# Patient Record
Sex: MALE | Race: OTHER | HISPANIC OR LATINO | Employment: OTHER | URBAN - METROPOLITAN AREA
[De-identification: names, ages, dates, MRNs, and addresses within clinical notes are randomized per-mention and may not be internally consistent; named-entity substitution may affect disease eponyms.]

---

## 2019-09-09 PROBLEM — C67.9 MALIGNANT NEOPLASM OF URINARY BLADDER: Status: ACTIVE | Noted: 2019-09-09

## 2019-09-16 ENCOUNTER — LAB VISIT (OUTPATIENT)
Dept: LAB | Facility: HOSPITAL | Age: 59
End: 2019-09-16
Attending: INTERNAL MEDICINE
Payer: COMMERCIAL

## 2019-09-16 DIAGNOSIS — C67.9 MALIGNANT NEOPLASM OF URINARY BLADDER, UNSPECIFIED SITE: ICD-10-CM

## 2019-09-16 LAB
ALBUMIN SERPL BCP-MCNC: 4.4 G/DL (ref 3.5–5.2)
ALP SERPL-CCNC: 89 U/L (ref 38–145)
ALT SERPL W/O P-5'-P-CCNC: 29 U/L (ref 10–44)
ANION GAP SERPL CALC-SCNC: 9 MMOL/L (ref 8–16)
AST SERPL-CCNC: 19 U/L (ref 17–59)
BASOPHILS # BLD AUTO: 0.07 K/UL (ref 0–0.2)
BASOPHILS NFR BLD: 0.5 % (ref 0–1.9)
BILIRUB SERPL-MCNC: 0.6 MG/DL (ref 0.2–1.3)
BUN SERPL-MCNC: 21 MG/DL (ref 9–21)
CALCIUM SERPL-MCNC: 9.1 MG/DL (ref 8.4–10.2)
CHLORIDE SERPL-SCNC: 105 MMOL/L (ref 95–110)
CO2 SERPL-SCNC: 25 MMOL/L (ref 22–31)
CREAT SERPL-MCNC: 1.45 MG/DL (ref 0.5–1.4)
DIFFERENTIAL METHOD: ABNORMAL
EOSINOPHIL # BLD AUTO: 0.3 K/UL (ref 0–0.5)
EOSINOPHIL NFR BLD: 1.8 % (ref 0–8)
ERYTHROCYTE [DISTWIDTH] IN BLOOD BY AUTOMATED COUNT: 14 % (ref 11.5–14.5)
EST. GFR  (AFRICAN AMERICAN): >60 ML/MIN/1.73 M^2
EST. GFR  (NON AFRICAN AMERICAN): 52 ML/MIN/1.73 M^2
GLUCOSE SERPL-MCNC: 110 MG/DL (ref 70–110)
HCT VFR BLD AUTO: 43.4 % (ref 40–54)
HGB BLD-MCNC: 14.3 G/DL (ref 14–18)
IMM GRANULOCYTES # BLD AUTO: 0.07 K/UL (ref 0–0.04)
IMM GRANULOCYTES NFR BLD AUTO: 0.5 % (ref 0–0.5)
LYMPHOCYTES # BLD AUTO: 1.6 K/UL (ref 1–4.8)
LYMPHOCYTES NFR BLD: 11.4 % (ref 18–48)
MAGNESIUM SERPL-MCNC: 2.4 MG/DL (ref 1.6–2.6)
MCH RBC QN AUTO: 27.5 PG (ref 27–31)
MCHC RBC AUTO-ENTMCNC: 32.9 G/DL (ref 32–36)
MCV RBC AUTO: 84 FL (ref 82–98)
MONOCYTES # BLD AUTO: 0.9 K/UL (ref 0.3–1)
MONOCYTES NFR BLD: 6.9 % (ref 4–15)
NEUTROPHILS # BLD AUTO: 10.7 K/UL (ref 1.8–7.7)
NEUTROPHILS NFR BLD: 78.9 % (ref 38–73)
NRBC BLD-RTO: 0 /100 WBC
PLATELET # BLD AUTO: 350 K/UL (ref 150–350)
PMV BLD AUTO: 9.3 FL (ref 9.2–12.9)
POTASSIUM SERPL-SCNC: 4.5 MMOL/L (ref 3.5–5.1)
PROT SERPL-MCNC: 7.6 G/DL (ref 6–8.4)
RBC # BLD AUTO: 5.2 M/UL (ref 4.6–6.2)
SODIUM SERPL-SCNC: 139 MMOL/L (ref 136–145)
WBC # BLD AUTO: 13.6 K/UL (ref 3.9–12.7)

## 2019-09-16 PROCEDURE — 85025 COMPLETE CBC W/AUTO DIFF WBC: CPT

## 2019-09-16 PROCEDURE — 83735 ASSAY OF MAGNESIUM: CPT | Mod: PN

## 2019-09-16 PROCEDURE — 80053 COMPREHEN METABOLIC PANEL: CPT | Mod: PN

## 2019-09-16 PROCEDURE — 36415 COLL VENOUS BLD VENIPUNCTURE: CPT | Mod: PN

## 2019-09-16 PROCEDURE — 85025 COMPLETE CBC W/AUTO DIFF WBC: CPT | Mod: PN

## 2019-09-16 PROCEDURE — 80053 COMPREHEN METABOLIC PANEL: CPT

## 2019-09-16 PROCEDURE — 83735 ASSAY OF MAGNESIUM: CPT

## 2019-09-23 ENCOUNTER — LAB VISIT (OUTPATIENT)
Dept: LAB | Facility: HOSPITAL | Age: 59
End: 2019-09-23
Attending: INTERNAL MEDICINE
Payer: COMMERCIAL

## 2019-09-23 DIAGNOSIS — C67.9 MALIGNANT NEOPLASM OF URINARY BLADDER, UNSPECIFIED SITE: ICD-10-CM

## 2019-09-23 LAB
BASOPHILS # BLD AUTO: 0.05 K/UL (ref 0–0.2)
BASOPHILS NFR BLD: 0.7 % (ref 0–1.9)
DIFFERENTIAL METHOD: ABNORMAL
EOSINOPHIL # BLD AUTO: 0.3 K/UL (ref 0–0.5)
EOSINOPHIL NFR BLD: 4 % (ref 0–8)
ERYTHROCYTE [DISTWIDTH] IN BLOOD BY AUTOMATED COUNT: 14.2 % (ref 11.5–14.5)
HCT VFR BLD AUTO: 42.8 % (ref 40–54)
HGB BLD-MCNC: 13.8 G/DL (ref 14–18)
IMM GRANULOCYTES # BLD AUTO: 0.04 K/UL (ref 0–0.04)
IMM GRANULOCYTES NFR BLD AUTO: 0.6 % (ref 0–0.5)
LYMPHOCYTES # BLD AUTO: 1.4 K/UL (ref 1–4.8)
LYMPHOCYTES NFR BLD: 20.1 % (ref 18–48)
MCH RBC QN AUTO: 26.7 PG (ref 27–31)
MCHC RBC AUTO-ENTMCNC: 32.2 G/DL (ref 32–36)
MCV RBC AUTO: 83 FL (ref 82–98)
MONOCYTES # BLD AUTO: 0.7 K/UL (ref 0.3–1)
MONOCYTES NFR BLD: 10.1 % (ref 4–15)
NEUTROPHILS # BLD AUTO: 4.6 K/UL (ref 1.8–7.7)
NEUTROPHILS NFR BLD: 64.5 % (ref 38–73)
NRBC BLD-RTO: 0 /100 WBC
PLATELET # BLD AUTO: 325 K/UL (ref 150–350)
PMV BLD AUTO: 9.3 FL (ref 9.2–12.9)
RBC # BLD AUTO: 5.17 M/UL (ref 4.6–6.2)
WBC # BLD AUTO: 7.06 K/UL (ref 3.9–12.7)

## 2019-09-23 PROCEDURE — 36415 COLL VENOUS BLD VENIPUNCTURE: CPT | Mod: PN

## 2019-09-23 PROCEDURE — 85025 COMPLETE CBC W/AUTO DIFF WBC: CPT | Mod: PN

## 2019-09-23 PROCEDURE — 85025 COMPLETE CBC W/AUTO DIFF WBC: CPT

## 2019-09-24 ENCOUNTER — INFUSION (OUTPATIENT)
Dept: INFUSION THERAPY | Facility: HOSPITAL | Age: 59
End: 2019-09-24
Attending: INTERNAL MEDICINE
Payer: COMMERCIAL

## 2019-09-24 VITALS
HEART RATE: 75 BPM | DIASTOLIC BLOOD PRESSURE: 87 MMHG | RESPIRATION RATE: 16 BRPM | SYSTOLIC BLOOD PRESSURE: 140 MMHG | TEMPERATURE: 98 F

## 2019-09-24 DIAGNOSIS — C67.9 MALIGNANT NEOPLASM OF URINARY BLADDER, UNSPECIFIED SITE: Primary | ICD-10-CM

## 2019-09-24 PROCEDURE — 96366 THER/PROPH/DIAG IV INF ADDON: CPT | Mod: PN

## 2019-09-24 PROCEDURE — 96413 CHEMO IV INFUSION 1 HR: CPT | Mod: PN

## 2019-09-24 PROCEDURE — 96417 CHEMO IV INFUS EACH ADDL SEQ: CPT | Mod: PN

## 2019-09-24 PROCEDURE — A4216 STERILE WATER/SALINE, 10 ML: HCPCS | Mod: PN | Performed by: INTERNAL MEDICINE

## 2019-09-24 PROCEDURE — 96367 TX/PROPH/DG ADDL SEQ IV INF: CPT | Mod: PN

## 2019-09-24 PROCEDURE — 63600175 PHARM REV CODE 636 W HCPCS: Mod: PN | Performed by: INTERNAL MEDICINE

## 2019-09-24 PROCEDURE — 25000003 PHARM REV CODE 250: Mod: PN | Performed by: INTERNAL MEDICINE

## 2019-09-24 RX ORDER — SODIUM CHLORIDE 0.9 % (FLUSH) 0.9 %
10 SYRINGE (ML) INJECTION
Status: DISCONTINUED | OUTPATIENT
Start: 2019-09-24 | End: 2019-09-24 | Stop reason: HOSPADM

## 2019-09-24 RX ADMIN — CISPLATIN 75 MG: 100 INJECTION, SOLUTION INTRAVENOUS at 12:09

## 2019-09-24 RX ADMIN — GEMCITABINE 2150 MG: 38 INJECTION, SOLUTION INTRAVENOUS at 02:09

## 2019-09-24 RX ADMIN — POTASSIUM CHLORIDE: 2 INJECTION, SOLUTION, CONCENTRATE INTRAVENOUS at 09:09

## 2019-09-24 RX ADMIN — POTASSIUM CHLORIDE: 2 INJECTION, SOLUTION, CONCENTRATE INTRAVENOUS at 02:09

## 2019-09-24 RX ADMIN — APREPITANT 130 MG: 130 INJECTION, EMULSION INTRAVENOUS at 11:09

## 2019-09-24 RX ADMIN — DEXAMETHASONE SODIUM PHOSPHATE: 4 INJECTION, SOLUTION INTRA-ARTICULAR; INTRALESIONAL; INTRAMUSCULAR; INTRAVENOUS; SOFT TISSUE at 12:09

## 2019-09-24 RX ADMIN — SODIUM CHLORIDE, PRESERVATIVE FREE 10 ML: 5 INJECTION INTRAVENOUS at 09:09

## 2019-09-24 NOTE — PLAN OF CARE
Pt. Completed treatment, tolerated without noted distress.Vtial signs stable. Patient discharged from infusion center ambulatory with wife. Reviewed with patient and wife the use of ordered antiemetics and when to phone MD office and infusion center. Infection prevention and adequate hydration  reviewed with patient and wife as well. Patient and wife had all present questions answered.

## 2019-09-25 ENCOUNTER — DOCUMENTATION ONLY (OUTPATIENT)
Dept: INFUSION THERAPY | Facility: HOSPITAL | Age: 59
End: 2019-09-25

## 2019-09-25 NOTE — PROGRESS NOTES
Nutrition: Met with patient on 9/24/209 for chemo new patient educaiton at the chair side. Will followup with patient at cycle 2. Marium Quinn,MS, RD, LDN

## 2019-09-30 ENCOUNTER — LAB VISIT (OUTPATIENT)
Dept: LAB | Facility: HOSPITAL | Age: 59
End: 2019-09-30
Attending: INTERNAL MEDICINE
Payer: COMMERCIAL

## 2019-09-30 DIAGNOSIS — C67.9 MALIGNANT NEOPLASM OF URINARY BLADDER, UNSPECIFIED SITE: ICD-10-CM

## 2019-09-30 LAB
ALBUMIN SERPL BCP-MCNC: 3.9 G/DL (ref 3.5–5.2)
ALP SERPL-CCNC: 81 U/L (ref 38–145)
ALT SERPL W/O P-5'-P-CCNC: 29 U/L (ref 10–44)
ANION GAP SERPL CALC-SCNC: 7 MMOL/L (ref 8–16)
AST SERPL-CCNC: 19 U/L (ref 17–59)
BASOPHILS # BLD AUTO: 0.03 K/UL (ref 0–0.2)
BASOPHILS NFR BLD: 0.5 % (ref 0–1.9)
BILIRUB SERPL-MCNC: 0.5 MG/DL (ref 0.2–1.3)
BUN SERPL-MCNC: 15 MG/DL (ref 9–21)
CALCIUM SERPL-MCNC: 8.6 MG/DL (ref 8.4–10.2)
CHLORIDE SERPL-SCNC: 103 MMOL/L (ref 95–110)
CO2 SERPL-SCNC: 26 MMOL/L (ref 22–31)
CREAT SERPL-MCNC: 1.43 MG/DL (ref 0.5–1.4)
DIFFERENTIAL METHOD: ABNORMAL
EOSINOPHIL # BLD AUTO: 0.1 K/UL (ref 0–0.5)
EOSINOPHIL NFR BLD: 1.9 % (ref 0–8)
ERYTHROCYTE [DISTWIDTH] IN BLOOD BY AUTOMATED COUNT: 14.1 % (ref 11.5–14.5)
EST. GFR  (AFRICAN AMERICAN): >60 ML/MIN/1.73 M^2
EST. GFR  (NON AFRICAN AMERICAN): 53 ML/MIN/1.73 M^2
GLUCOSE SERPL-MCNC: 128 MG/DL (ref 70–110)
HCT VFR BLD AUTO: 40.2 % (ref 40–54)
HGB BLD-MCNC: 12.7 G/DL (ref 14–18)
IMM GRANULOCYTES # BLD AUTO: 0.03 K/UL (ref 0–0.04)
IMM GRANULOCYTES NFR BLD AUTO: 0.5 % (ref 0–0.5)
LYMPHOCYTES # BLD AUTO: 1 K/UL (ref 1–4.8)
LYMPHOCYTES NFR BLD: 17.3 % (ref 18–48)
MAGNESIUM SERPL-MCNC: 2.3 MG/DL (ref 1.6–2.6)
MCH RBC QN AUTO: 26.2 PG (ref 27–31)
MCHC RBC AUTO-ENTMCNC: 31.6 G/DL (ref 32–36)
MCV RBC AUTO: 83 FL (ref 82–98)
MONOCYTES # BLD AUTO: 0.5 K/UL (ref 0.3–1)
MONOCYTES NFR BLD: 7.8 % (ref 4–15)
NEUTROPHILS # BLD AUTO: 4.2 K/UL (ref 1.8–7.7)
NEUTROPHILS NFR BLD: 72 % (ref 38–73)
NRBC BLD-RTO: 0 /100 WBC
PLATELET # BLD AUTO: 213 K/UL (ref 150–350)
PMV BLD AUTO: 9.5 FL (ref 9.2–12.9)
POTASSIUM SERPL-SCNC: 4.4 MMOL/L (ref 3.5–5.1)
PROT SERPL-MCNC: 6.8 G/DL (ref 6–8.4)
RBC # BLD AUTO: 4.85 M/UL (ref 4.6–6.2)
SODIUM SERPL-SCNC: 136 MMOL/L (ref 136–145)
WBC # BLD AUTO: 5.89 K/UL (ref 3.9–12.7)

## 2019-09-30 PROCEDURE — 80053 COMPREHEN METABOLIC PANEL: CPT

## 2019-09-30 PROCEDURE — 83735 ASSAY OF MAGNESIUM: CPT | Mod: PN

## 2019-09-30 PROCEDURE — 85025 COMPLETE CBC W/AUTO DIFF WBC: CPT

## 2019-09-30 PROCEDURE — 85025 COMPLETE CBC W/AUTO DIFF WBC: CPT | Mod: PN

## 2019-09-30 PROCEDURE — 80053 COMPREHEN METABOLIC PANEL: CPT | Mod: PN

## 2019-09-30 PROCEDURE — 36415 COLL VENOUS BLD VENIPUNCTURE: CPT | Mod: PN

## 2019-09-30 PROCEDURE — 83735 ASSAY OF MAGNESIUM: CPT

## 2019-10-02 ENCOUNTER — INFUSION (OUTPATIENT)
Dept: INFUSION THERAPY | Facility: HOSPITAL | Age: 59
End: 2019-10-02
Attending: INTERNAL MEDICINE
Payer: COMMERCIAL

## 2019-10-02 VITALS
HEART RATE: 80 BPM | WEIGHT: 217.63 LBS | DIASTOLIC BLOOD PRESSURE: 74 MMHG | HEIGHT: 67 IN | BODY MASS INDEX: 34.16 KG/M2 | RESPIRATION RATE: 18 BRPM | SYSTOLIC BLOOD PRESSURE: 118 MMHG | TEMPERATURE: 98 F

## 2019-10-02 DIAGNOSIS — C67.9 MALIGNANT NEOPLASM OF URINARY BLADDER, UNSPECIFIED SITE: Primary | ICD-10-CM

## 2019-10-02 PROCEDURE — 96366 THER/PROPH/DIAG IV INF ADDON: CPT | Mod: PN

## 2019-10-02 PROCEDURE — 96375 TX/PRO/DX INJ NEW DRUG ADDON: CPT | Mod: PN

## 2019-10-02 PROCEDURE — 96367 TX/PROPH/DG ADDL SEQ IV INF: CPT | Mod: PN

## 2019-10-02 PROCEDURE — 63600175 PHARM REV CODE 636 W HCPCS: Mod: PN | Performed by: INTERNAL MEDICINE

## 2019-10-02 PROCEDURE — 96417 CHEMO IV INFUS EACH ADDL SEQ: CPT | Mod: PN

## 2019-10-02 PROCEDURE — 96413 CHEMO IV INFUSION 1 HR: CPT | Mod: PN

## 2019-10-02 RX ORDER — SODIUM CHLORIDE 0.9 % (FLUSH) 0.9 %
10 SYRINGE (ML) INJECTION
Status: DISCONTINUED | OUTPATIENT
Start: 2019-10-02 | End: 2019-10-02 | Stop reason: HOSPADM

## 2019-10-02 RX ADMIN — POTASSIUM CHLORIDE: 2 INJECTION, SOLUTION, CONCENTRATE INTRAVENOUS at 01:10

## 2019-10-02 RX ADMIN — APREPITANT 130 MG: 130 INJECTION, EMULSION INTRAVENOUS at 09:10

## 2019-10-02 RX ADMIN — POTASSIUM CHLORIDE: 2 INJECTION, SOLUTION, CONCENTRATE INTRAVENOUS at 09:10

## 2019-10-02 RX ADMIN — GEMCITABINE 2150 MG: 38 INJECTION, SOLUTION INTRAVENOUS at 11:10

## 2019-10-02 RX ADMIN — CISPLATIN 75 MG: 100 INJECTION, SOLUTION INTRAVENOUS at 12:10

## 2019-10-02 RX ADMIN — DEXAMETHASONE SODIUM PHOSPHATE: 4 INJECTION, SOLUTION INTRA-ARTICULAR; INTRALESIONAL; INTRAMUSCULAR; INTRAVENOUS; SOFT TISSUE at 09:10

## 2019-10-02 NOTE — PLAN OF CARE
"Problem: Adult Inpatient Plan of Care  Goal: Plan of Care Review  Outcome: Ongoing, Progressing    /74   Pulse 80   Temp 97.9 °F (36.6 °C)   Resp 18   Ht 5' 7" (1.702 m)   Wt 98.7 kg (217 lb 9.5 oz)   BMI 34.08 kg/m²   Patient tolerated treatment well. VSS. PIV flused with NS and blood return noted. Pt refused OBI today but will come in tomorrow for neulasta sub-q injection.      "

## 2019-10-02 NOTE — PLAN OF CARE
"Problem: Nausea and Vomiting (Chemotherapy Effects)  Goal: Fluid and Electrolyte Balance  Outcome: Ongoing, Progressing    BP (!) 132/90   Pulse 77   Temp 97.9 °F (36.6 °C)   Resp 18   Ht 5' 7" (1.702 m)   Wt 98.7 kg (217 lb 9.5 oz)   BMI 34.08 kg/m²   Patient here today for cisplatin/gemzar treatment. VSS. PIV placed to LAC on 2nd attempt. Pt tolerated well. Premeds given. Awaiting pre-hydration and txt meds.   Pt reports recent fevers and constipation, resolved.   Questions and concerns addressed.      "

## 2019-10-03 ENCOUNTER — INFUSION (OUTPATIENT)
Dept: INFUSION THERAPY | Facility: HOSPITAL | Age: 59
End: 2019-10-03
Attending: INTERNAL MEDICINE
Payer: COMMERCIAL

## 2019-10-03 VITALS
SYSTOLIC BLOOD PRESSURE: 127 MMHG | DIASTOLIC BLOOD PRESSURE: 85 MMHG | TEMPERATURE: 98 F | RESPIRATION RATE: 16 BRPM | HEART RATE: 73 BPM

## 2019-10-03 DIAGNOSIS — C67.9 MALIGNANT NEOPLASM OF URINARY BLADDER, UNSPECIFIED SITE: Primary | ICD-10-CM

## 2019-10-03 PROCEDURE — 96372 THER/PROPH/DIAG INJ SC/IM: CPT | Mod: PN

## 2019-10-03 PROCEDURE — 63600175 PHARM REV CODE 636 W HCPCS: Mod: JG,PN | Performed by: INTERNAL MEDICINE

## 2019-10-03 RX ADMIN — PEGFILGRASTIM 6 MG: 6 INJECTION SUBCUTANEOUS at 11:10

## 2019-10-03 NOTE — PLAN OF CARE
Tolerated injection without any distress.  Reviewed upcoming appointments.  Amb off unit independently by self.

## 2019-10-07 ENCOUNTER — LAB VISIT (OUTPATIENT)
Dept: LAB | Facility: HOSPITAL | Age: 59
End: 2019-10-07
Attending: INTERNAL MEDICINE
Payer: COMMERCIAL

## 2019-10-07 DIAGNOSIS — C67.9 MALIGNANT NEOPLASM OF URINARY BLADDER, UNSPECIFIED SITE: ICD-10-CM

## 2019-10-07 LAB
ANISOCYTOSIS BLD QL SMEAR: SLIGHT
BASOPHILS NFR BLD: 0 % (ref 0–1.9)
DIFFERENTIAL METHOD: ABNORMAL
DOHLE BOD BLD QL SMEAR: PRESENT
EOSINOPHIL NFR BLD: 0 % (ref 0–8)
ERYTHROCYTE [DISTWIDTH] IN BLOOD BY AUTOMATED COUNT: 14.6 % (ref 11.5–14.5)
HCT VFR BLD AUTO: 40.3 % (ref 40–54)
HGB BLD-MCNC: 12.7 G/DL (ref 14–18)
IMM GRANULOCYTES # BLD AUTO: ABNORMAL K/UL (ref 0–0.04)
IMM GRANULOCYTES NFR BLD AUTO: ABNORMAL % (ref 0–0.5)
LYMPHOCYTES NFR BLD: 6 % (ref 18–48)
MCH RBC QN AUTO: 26.5 PG (ref 27–31)
MCHC RBC AUTO-ENTMCNC: 31.5 G/DL (ref 32–36)
MCV RBC AUTO: 84 FL (ref 82–98)
MONOCYTES NFR BLD: 5 % (ref 4–15)
NEUTROPHILS # BLD AUTO: 22.2 K/UL (ref 1.8–7.7)
NEUTROPHILS NFR BLD: 80 % (ref 38–73)
NEUTS BAND NFR BLD MANUAL: 9 %
NRBC BLD-RTO: 0 /100 WBC
PLATELET # BLD AUTO: 191 K/UL (ref 150–350)
PMV BLD AUTO: 9 FL (ref 9.2–12.9)
POLYCHROMASIA BLD QL SMEAR: ABNORMAL
RBC # BLD AUTO: 4.8 M/UL (ref 4.6–6.2)
WBC # BLD AUTO: 24.98 K/UL (ref 3.9–12.7)

## 2019-10-07 PROCEDURE — 85007 BL SMEAR W/DIFF WBC COUNT: CPT

## 2019-10-07 PROCEDURE — 85027 COMPLETE CBC AUTOMATED: CPT

## 2019-10-07 PROCEDURE — 85027 COMPLETE CBC AUTOMATED: CPT | Mod: PN

## 2019-10-07 PROCEDURE — 85007 BL SMEAR W/DIFF WBC COUNT: CPT | Mod: PN

## 2019-10-07 PROCEDURE — 36415 COLL VENOUS BLD VENIPUNCTURE: CPT | Mod: PN

## 2019-10-15 ENCOUNTER — LAB VISIT (OUTPATIENT)
Dept: LAB | Facility: HOSPITAL | Age: 59
End: 2019-10-15
Attending: INTERNAL MEDICINE
Payer: COMMERCIAL

## 2019-10-15 DIAGNOSIS — C67.9 MALIGNANT NEOPLASM OF URINARY BLADDER, UNSPECIFIED SITE: ICD-10-CM

## 2019-10-15 LAB
ALBUMIN SERPL BCP-MCNC: 4.1 G/DL (ref 3.5–5.2)
ALP SERPL-CCNC: 106 U/L (ref 38–145)
ALT SERPL W/O P-5'-P-CCNC: 23 U/L (ref 10–44)
ANION GAP SERPL CALC-SCNC: 9 MMOL/L (ref 8–16)
ANISOCYTOSIS BLD QL SMEAR: SLIGHT
AST SERPL-CCNC: 21 U/L (ref 17–59)
BASOPHILS NFR BLD: 0 % (ref 0–1.9)
BILIRUB SERPL-MCNC: 0.4 MG/DL (ref 0.2–1.3)
BUN SERPL-MCNC: 21 MG/DL (ref 9–21)
CALCIUM SERPL-MCNC: 8.8 MG/DL (ref 8.4–10.2)
CHLORIDE SERPL-SCNC: 102 MMOL/L (ref 95–110)
CO2 SERPL-SCNC: 25 MMOL/L (ref 22–31)
CREAT SERPL-MCNC: 1.51 MG/DL (ref 0.5–1.4)
DIFFERENTIAL METHOD: ABNORMAL
EOSINOPHIL NFR BLD: 1 % (ref 0–8)
ERYTHROCYTE [DISTWIDTH] IN BLOOD BY AUTOMATED COUNT: 15.6 % (ref 11.5–14.5)
EST. GFR  (AFRICAN AMERICAN): 57 ML/MIN/1.73 M^2
EST. GFR  (NON AFRICAN AMERICAN): 49 ML/MIN/1.73 M^2
GLUCOSE SERPL-MCNC: 104 MG/DL (ref 70–110)
HCT VFR BLD AUTO: 39.1 % (ref 40–54)
HGB BLD-MCNC: 12.7 G/DL (ref 14–18)
IMM GRANULOCYTES # BLD AUTO: ABNORMAL K/UL (ref 0–0.04)
IMM GRANULOCYTES NFR BLD AUTO: ABNORMAL % (ref 0–0.5)
LYMPHOCYTES NFR BLD: 19 % (ref 18–48)
MAGNESIUM SERPL-MCNC: 2.2 MG/DL (ref 1.6–2.6)
MCH RBC QN AUTO: 26.6 PG (ref 27–31)
MCHC RBC AUTO-ENTMCNC: 32.5 G/DL (ref 32–36)
MCV RBC AUTO: 82 FL (ref 82–98)
MONOCYTES NFR BLD: 7 % (ref 4–15)
NEUTROPHILS # BLD AUTO: 10.1 K/UL (ref 1.8–7.7)
NEUTROPHILS NFR BLD: 72 % (ref 38–73)
NEUTS BAND NFR BLD MANUAL: 1 %
NRBC BLD-RTO: 0 /100 WBC
PLATELET # BLD AUTO: 311 K/UL (ref 150–350)
PLATELET BLD QL SMEAR: ABNORMAL
PMV BLD AUTO: 9.5 FL (ref 9.2–12.9)
POTASSIUM SERPL-SCNC: 4 MMOL/L (ref 3.5–5.1)
PROT SERPL-MCNC: 6.8 G/DL (ref 6–8.4)
RBC # BLD AUTO: 4.78 M/UL (ref 4.6–6.2)
SODIUM SERPL-SCNC: 136 MMOL/L (ref 136–145)
WBC # BLD AUTO: 13.84 K/UL (ref 3.9–12.7)

## 2019-10-15 PROCEDURE — 85007 BL SMEAR W/DIFF WBC COUNT: CPT

## 2019-10-15 PROCEDURE — 83735 ASSAY OF MAGNESIUM: CPT | Mod: PN

## 2019-10-15 PROCEDURE — 36415 COLL VENOUS BLD VENIPUNCTURE: CPT | Mod: PN

## 2019-10-15 PROCEDURE — 83735 ASSAY OF MAGNESIUM: CPT

## 2019-10-15 PROCEDURE — 85027 COMPLETE CBC AUTOMATED: CPT

## 2019-10-15 PROCEDURE — 85007 BL SMEAR W/DIFF WBC COUNT: CPT | Mod: PN

## 2019-10-15 PROCEDURE — 80053 COMPREHEN METABOLIC PANEL: CPT | Mod: PN

## 2019-10-15 PROCEDURE — 80053 COMPREHEN METABOLIC PANEL: CPT

## 2019-10-15 PROCEDURE — 85027 COMPLETE CBC AUTOMATED: CPT | Mod: PN

## 2019-10-16 ENCOUNTER — INFUSION (OUTPATIENT)
Dept: INFUSION THERAPY | Facility: HOSPITAL | Age: 59
End: 2019-10-16
Attending: INTERNAL MEDICINE
Payer: COMMERCIAL

## 2019-10-16 ENCOUNTER — DOCUMENTATION ONLY (OUTPATIENT)
Dept: INFUSION THERAPY | Facility: HOSPITAL | Age: 59
End: 2019-10-16

## 2019-10-16 VITALS
HEIGHT: 67 IN | HEART RATE: 70 BPM | SYSTOLIC BLOOD PRESSURE: 104 MMHG | TEMPERATURE: 98 F | DIASTOLIC BLOOD PRESSURE: 72 MMHG | BODY MASS INDEX: 34.53 KG/M2 | WEIGHT: 220 LBS | RESPIRATION RATE: 18 BRPM

## 2019-10-16 DIAGNOSIS — C67.9 MALIGNANT NEOPLASM OF URINARY BLADDER, UNSPECIFIED SITE: Primary | ICD-10-CM

## 2019-10-16 PROCEDURE — 63600175 PHARM REV CODE 636 W HCPCS: Mod: PN | Performed by: PHYSICIAN ASSISTANT

## 2019-10-16 PROCEDURE — A4216 STERILE WATER/SALINE, 10 ML: HCPCS | Mod: PN | Performed by: PHYSICIAN ASSISTANT

## 2019-10-16 PROCEDURE — 96413 CHEMO IV INFUSION 1 HR: CPT | Mod: PN

## 2019-10-16 PROCEDURE — 96417 CHEMO IV INFUS EACH ADDL SEQ: CPT | Mod: PN

## 2019-10-16 PROCEDURE — 25000003 PHARM REV CODE 250: Mod: PN | Performed by: PHYSICIAN ASSISTANT

## 2019-10-16 PROCEDURE — 96366 THER/PROPH/DIAG IV INF ADDON: CPT | Mod: PN

## 2019-10-16 PROCEDURE — 96375 TX/PRO/DX INJ NEW DRUG ADDON: CPT | Mod: PN

## 2019-10-16 RX ORDER — SODIUM CHLORIDE 0.9 % (FLUSH) 0.9 %
10 SYRINGE (ML) INJECTION
Status: DISCONTINUED | OUTPATIENT
Start: 2019-10-16 | End: 2019-10-16 | Stop reason: HOSPADM

## 2019-10-16 RX ADMIN — GEMCITABINE 2150 MG: 38 INJECTION, SOLUTION INTRAVENOUS at 11:10

## 2019-10-16 RX ADMIN — POTASSIUM CHLORIDE: 2 INJECTION, SOLUTION, CONCENTRATE INTRAVENOUS at 12:10

## 2019-10-16 RX ADMIN — APREPITANT 130 MG: 130 INJECTION, EMULSION INTRAVENOUS at 09:10

## 2019-10-16 RX ADMIN — CISPLATIN 75 MG: 100 INJECTION, SOLUTION INTRAVENOUS at 11:10

## 2019-10-16 RX ADMIN — POTASSIUM CHLORIDE: 2 INJECTION, SOLUTION, CONCENTRATE INTRAVENOUS at 09:10

## 2019-10-16 RX ADMIN — Medication 10 ML: at 02:10

## 2019-10-16 RX ADMIN — DEXAMETHASONE SODIUM PHOSPHATE: 4 INJECTION, SOLUTION INTRA-ARTICULAR; INTRALESIONAL; INTRAMUSCULAR; INTRAVENOUS; SOFT TISSUE at 10:10

## 2019-10-16 NOTE — PLAN OF CARE
Adequate for discharge.   Pt tolerated chemo infusion without noted distress.  Reviewed upcoming appointments.  All questions answered. Advised to call MD with any questions or concerns.   Ambulated from infusion center.

## 2019-10-16 NOTE — PLAN OF CARE
Problem: Adult Inpatient Plan of Care  Goal: Patient-Specific Goal (Individualization)  Outcome: Ongoing, Progressing  Flowsheets (Taken 10/16/2019 0900)  Individualized Care Needs: warm blanket and window  Anxieties, Fears or Concerns: none  Patient-Specific Goals (Include Timeframe): infection prevention during treatment

## 2019-10-16 NOTE — PLAN OF CARE
Problem: Fatigue (Oncology Care)  Goal: Improved Activity Tolerance  Intervention: Promote Energy Conservation  Flowsheets (Taken 10/16/2019 0900)  Fatigue Management: frequent rest breaks encouraged  Activity Management: activity adjusted per tolerance

## 2019-10-16 NOTE — PROGRESS NOTES
Nutrition: Met with patient and patients wife today while patient was here for cycle 2 of chemo. Patient is not currently a nutritional risk. Patient is eating well. No issues or concerns at current. Wt: 220# Weight stable. Offered support and encouragement. Marium Quinn,MS, RD, LDN

## 2019-10-21 ENCOUNTER — LAB VISIT (OUTPATIENT)
Dept: LAB | Facility: HOSPITAL | Age: 59
End: 2019-10-21
Attending: INTERNAL MEDICINE
Payer: COMMERCIAL

## 2019-10-21 DIAGNOSIS — C67.9 MALIGNANT NEOPLASM OF URINARY BLADDER, UNSPECIFIED SITE: ICD-10-CM

## 2019-10-21 LAB
BASOPHILS # BLD AUTO: 0.05 K/UL (ref 0–0.2)
BASOPHILS NFR BLD: 0.6 % (ref 0–1.9)
DIFFERENTIAL METHOD: ABNORMAL
EOSINOPHIL # BLD AUTO: 0.1 K/UL (ref 0–0.5)
EOSINOPHIL NFR BLD: 1 % (ref 0–8)
ERYTHROCYTE [DISTWIDTH] IN BLOOD BY AUTOMATED COUNT: 15.6 % (ref 11.5–14.5)
HCT VFR BLD AUTO: 39 % (ref 40–54)
HGB BLD-MCNC: 12.5 G/DL (ref 14–18)
IMM GRANULOCYTES # BLD AUTO: 0.04 K/UL (ref 0–0.04)
IMM GRANULOCYTES NFR BLD AUTO: 0.5 % (ref 0–0.5)
LYMPHOCYTES # BLD AUTO: 1.1 K/UL (ref 1–4.8)
LYMPHOCYTES NFR BLD: 14.2 % (ref 18–48)
MCH RBC QN AUTO: 26.4 PG (ref 27–31)
MCHC RBC AUTO-ENTMCNC: 32.1 G/DL (ref 32–36)
MCV RBC AUTO: 82 FL (ref 82–98)
MONOCYTES # BLD AUTO: 0.1 K/UL (ref 0.3–1)
MONOCYTES NFR BLD: 1.3 % (ref 4–15)
NEUTROPHILS # BLD AUTO: 6.4 K/UL (ref 1.8–7.7)
NEUTROPHILS NFR BLD: 82.4 % (ref 38–73)
NRBC BLD-RTO: 0 /100 WBC
PLATELET # BLD AUTO: 364 K/UL (ref 150–350)
PMV BLD AUTO: 9.3 FL (ref 9.2–12.9)
RBC # BLD AUTO: 4.74 M/UL (ref 4.6–6.2)
WBC # BLD AUTO: 7.77 K/UL (ref 3.9–12.7)

## 2019-10-21 PROCEDURE — 36415 COLL VENOUS BLD VENIPUNCTURE: CPT | Mod: PN

## 2019-10-21 PROCEDURE — 85025 COMPLETE CBC W/AUTO DIFF WBC: CPT

## 2019-10-21 PROCEDURE — 85025 COMPLETE CBC W/AUTO DIFF WBC: CPT | Mod: PN

## 2019-10-23 ENCOUNTER — INFUSION (OUTPATIENT)
Dept: INFUSION THERAPY | Facility: HOSPITAL | Age: 59
End: 2019-10-23
Attending: INTERNAL MEDICINE
Payer: COMMERCIAL

## 2019-10-23 VITALS
SYSTOLIC BLOOD PRESSURE: 107 MMHG | BODY MASS INDEX: 34.67 KG/M2 | DIASTOLIC BLOOD PRESSURE: 73 MMHG | TEMPERATURE: 98 F | OXYGEN SATURATION: 98 % | HEART RATE: 82 BPM | HEIGHT: 67 IN | WEIGHT: 220.88 LBS | RESPIRATION RATE: 18 BRPM

## 2019-10-23 DIAGNOSIS — C67.9 MALIGNANT NEOPLASM OF URINARY BLADDER, UNSPECIFIED SITE: Primary | ICD-10-CM

## 2019-10-23 PROCEDURE — 96413 CHEMO IV INFUSION 1 HR: CPT | Mod: PN

## 2019-10-23 PROCEDURE — A4216 STERILE WATER/SALINE, 10 ML: HCPCS | Mod: PN | Performed by: PHYSICIAN ASSISTANT

## 2019-10-23 PROCEDURE — 96367 TX/PROPH/DG ADDL SEQ IV INF: CPT | Mod: PN

## 2019-10-23 PROCEDURE — 96366 THER/PROPH/DIAG IV INF ADDON: CPT | Mod: PN

## 2019-10-23 PROCEDURE — 96417 CHEMO IV INFUS EACH ADDL SEQ: CPT | Mod: PN

## 2019-10-23 PROCEDURE — 25000003 PHARM REV CODE 250: Mod: PN | Performed by: PHYSICIAN ASSISTANT

## 2019-10-23 PROCEDURE — 63600175 PHARM REV CODE 636 W HCPCS: Mod: JG,PN | Performed by: PHYSICIAN ASSISTANT

## 2019-10-23 PROCEDURE — 63600175 PHARM REV CODE 636 W HCPCS: Mod: PN | Performed by: INTERNAL MEDICINE

## 2019-10-23 PROCEDURE — 96375 TX/PRO/DX INJ NEW DRUG ADDON: CPT | Mod: PN

## 2019-10-23 RX ORDER — SODIUM CHLORIDE 0.9 % (FLUSH) 0.9 %
10 SYRINGE (ML) INJECTION
Status: DISCONTINUED | OUTPATIENT
Start: 2019-10-23 | End: 2019-10-23 | Stop reason: HOSPADM

## 2019-10-23 RX ORDER — HEPARIN 100 UNIT/ML
500 SYRINGE INTRAVENOUS
Status: DISCONTINUED | OUTPATIENT
Start: 2019-10-23 | End: 2019-10-23 | Stop reason: HOSPADM

## 2019-10-23 RX ADMIN — APREPITANT 130 MG: 130 INJECTION, EMULSION INTRAVENOUS at 11:10

## 2019-10-23 RX ADMIN — POTASSIUM CHLORIDE: 2 INJECTION, SOLUTION, CONCENTRATE INTRAVENOUS at 02:10

## 2019-10-23 RX ADMIN — DEXAMETHASONE SODIUM PHOSPHATE: 4 INJECTION, SOLUTION INTRA-ARTICULAR; INTRALESIONAL; INTRAMUSCULAR; INTRAVENOUS; SOFT TISSUE at 11:10

## 2019-10-23 RX ADMIN — SODIUM CHLORIDE: 0.9 INJECTION, SOLUTION INTRAVENOUS at 09:10

## 2019-10-23 RX ADMIN — Medication 10 ML: at 04:10

## 2019-10-23 RX ADMIN — GEMCITABINE 2150 MG: 38 INJECTION, SOLUTION INTRAVENOUS at 11:10

## 2019-10-23 RX ADMIN — CISPLATIN 75 MG: 100 INJECTION, SOLUTION INTRAVENOUS at 12:10

## 2019-10-23 RX ADMIN — POTASSIUM CHLORIDE: 2 INJECTION, SOLUTION, CONCENTRATE INTRAVENOUS at 09:10

## 2019-10-23 RX ADMIN — Medication 10 ML: at 09:10

## 2019-10-23 NOTE — PLAN OF CARE
Lashay presented to infusion suite for treatment.  Assessment completed and POC discussed  Verbally acknowledged understanding

## 2019-10-23 NOTE — NURSING
1210  Pt complained of iv site burning, Iv site free of redness or edema   Blood return noted  Stated pain went away after iv gemzar stopped infusing.  Iv restarted in r antecubital.  Iv sited number 1 clean dry and intact and d/c

## 2019-10-23 NOTE — PLAN OF CARE
Discussed with pt about nutrition  He stated that everything sorta has a metallic taste to him right now

## 2019-10-23 NOTE — PLAN OF CARE
Tolerated treatment well Discharge instructions given as well as copy of scheduled and avs.  Ambulated to private vehicle without difficulty  NAD

## 2019-10-23 NOTE — DISCHARGE INSTRUCTIONS
Cisplatin injection  What is this medicine?  CISPLATIN (SIS anisha tin) is a chemotherapy drug. It targets fast dividing cells, like cancer cells, and causes these cells to die. This medicine is used to treat many types of cancer like bladder, ovarian, and testicular cancers.  How should I use this medicine?  This drug is given as an infusion into a vein. It is administered in a hospital or clinic by a specially trained health care professional.  Talk to your pediatrician regarding the use of this medicine in children. Special care may be needed.  What side effects may I notice from receiving this medicine?  Side effects that you should report to your doctor or health care professional as soon as possible:  · allergic reactions like skin rash, itching or hives, swelling of the face, lips, or tongue  · signs of infection - fever or chills, cough, sore throat, pain or difficulty passing urine  · signs of decreased platelets or bleeding - bruising, pinpoint red spots on the skin, black, tarry stools, nosebleeds  · signs of decreased red blood cells - unusually weak or tired, fainting spells, lightheadedness  · breathing problems  · changes in hearing  · gout pain  · low blood counts - This drug may decrease the number of white blood cells, red blood cells and platelets. You may be at increased risk for infections and bleeding.  · nausea and vomiting  · pain, swelling, redness or irritation at the injection site  · pain, tingling, numbness in the hands or feet  · problems with balance, movement  · trouble passing urine or change in the amount of urine  Side effects that usually do not require medical attention (report to your doctor or health care professional if they continue or are bothersome):  · changes in vision  · loss of appetite  · metallic taste in the mouth or changes in taste  What may interact with this medicine?  · dofetilide  · foscarnet  · medicines for seizures  · medicines to increase blood counts like  filgrastim, pegfilgrastim, sargramostim  · probenecid  · pyridoxine used with altretamine  · rituximab  · some antibiotics like amikacin, gentamicin, neomycin, polymyxin B, streptomycin, tobramycin  · sulfinpyrazone  · vaccines  · zalcitabine  Talk to your doctor or health care professional before taking any of these medicines:  · acetaminophen  · aspirin  · ibuprofen  · ketoprofen  · naproxen  What if I miss a dose?  It is important not to miss a dose. Call your doctor or health care professional if you are unable to keep an appointment.  Where should I keep my medicine?  This drug is given in a hospital or clinic and will not be stored at home.  What should I tell my health care provider before I take this medicine?  They need to know if you have any of these conditions:  · blood disorders  · hearing problems  · kidney disease  · recent or ongoing radiation therapy  · an unusual or allergic reaction to cisplatin, carboplatin, other chemotherapy, other medicines, foods, dyes, or preservatives  · pregnant or trying to get pregnant  · breast-feeding  What should I watch for while using this medicine?  Your condition will be monitored carefully while you are receiving this medicine. You will need important blood work done while you are taking this medicine.  This drug may make you feel generally unwell. This is not uncommon, as chemotherapy can affect healthy cells as well as cancer cells. Report any side effects. Continue your course of treatment even though you feel ill unless your doctor tells you to stop.  In some cases, you may be given additional medicines to help with side effects. Follow all directions for their use.  Call your doctor or health care professional for advice if you get a fever, chills or sore throat, or other symptoms of a cold or flu. Do not treat yourself. This drug decreases your body's ability to fight infections. Try to avoid being around people who are sick.  This medicine may increase  your risk to bruise or bleed. Call your doctor or health care professional if you notice any unusual bleeding.  Be careful brushing and flossing your teeth or using a toothpick because you may get an infection or bleed more easily. If you have any dental work done, tell your dentist you are receiving this medicine.  Avoid taking products that contain aspirin, acetaminophen, ibuprofen, naproxen, or ketoprofen unless instructed by your doctor. These medicines may hide a fever.  Do not become pregnant while taking this medicine. Women should inform their doctor if they wish to become pregnant or think they might be pregnant. There is a potential for serious side effects to an unborn child. Talk to your health care professional or pharmacist for more information. Do not breast-feed an infant while taking this medicine.  Drink fluids as directed while you are taking this medicine. This will help protect your kidneys.  Call your doctor or health care professional if you get diarrhea. Do not treat yourself.  NOTE:This sheet is a summary. It may not cover all possible information. If you have questions about this medicine, talk to your doctor, pharmacist, or health care provider. Copyright© 2017 [employer]        Discharge Instructions for Chemotherapy  Your healthcare provider prescribed a type of medicine therapy for you called chemotherapy. Healthcare providers prescribe chemotherapy for many different types of illnesses, including cancer. There are many types of chemotherapy. This sheet provides general guidelines on how you can take care of yourself after your chemotherapy.  Mouth care  Dont be discouraged if you get mouth sores, even if you are following all your healthcare providers instructions. Many people get mouth sores as a side effect of chemotherapy. Heres what you can do to prevent mouth sores:  · Keep your mouth clean. Brush your teeth with a soft-bristle toothbrush after every meal.  · Ask if you  should use a toothpaste with fluoride, or a mixture of 1 teaspoon of salt in 8-ounces of water to brush your teeth.   · Use an oral swab or special soft toothbrush if your gums bleed during regular brushing.  · Don't use dental floss if it causes your gums to bleed.  · Use any mouthwashes given to you as directed.  · If you cant tolerate regular methods, use salt and baking soda to clean your mouth. Mix 1 teaspoon of salt and 1 teaspoon of baking soda into an 8-ounce glass of warm water. Swish and spit.  · If you wear dentures, you may be told to wear them only when you eat, ask your healthcare provider. Clean dentures twice a day and soak in antimicrobial solution when you aren't wearing them. Rinse your mouth after each meal.   · Watch your mouth and tongue for white patches. This may be a sign of a type of yeast infection (thrush), a common side effect of chemotherapy. Be sure to tell your healthcare provider about these patches. Medicine can be prescribed to treat it.  Other home care  Here's what else you can do:  · Try to exercise. Exercise keeps you strong and keeps your heart and lungs active. Walking and yoga are good types of exercise.   · Keep clean. During chemotherapy, your body cant fight infection very well. Take short baths or showers.  ¨ Wash your hands before you eat and after going to the bathroom.  ¨ Use moisturizing soap. Chemotherapy can make your skin dry.  ¨ Apply moisturizing lotion several times a day to help relieve dry skin.  ¨ Dont take very hot or very cold showers or baths.  · Dont be surprised if your chemotherapy causes slight burns to your skin--usually on the hands and feet. Some medicines used in high doses cause this to happen. Ask for a special cream to help relieve the burn and protect your skin.  · Avoid people who are sick with illnesses and diseases you could catch, such as colds, flu, measles, or chicken pox as well as people who have recently had vaccinations for these  illnesses.   · Let your healthcare provider know if your throat is sore. You may have an infection that needs treatment.  · Remember, many patients feel sick and lose their appetites during treatment. Eat small meals several times a day to keep your strength up:  ¨ Choose bland foods with little taste or smell if you are reacting strongly to food.  ¨ Be sure to cook all food thoroughly. This kills bacteria and helps you avoid infection.  ¨ Eat foods that are soft. Soft foods are less likely to cause stomach irritation.  ¨ Try to eat a variety of foods for a well-balanced diet. Drink plenty of fluids and eat foods with fiber to avoid constipation.      When to call your healthcare provider  Call your healthcare provider right away if you have any of the following:  · Unexplained bleeding  · Trouble concentrating  · Ongoing fatigue  · Shortness of breath, wheezing, trouble breathing, or bad cough  · Rapid, irregular heartbeat, or chest pain  · Dizziness, lightheadedness  · Constant feeling of being cold  · Hives or a cut or rash that swells, turns red, feels hot or painful, or begins to ooze  · Burning when you urinate  · Fever of 100.4°F (38°C) or higher, or chills   Date Last Reviewed: 5/1/2016  © 0793-3489 [employer]. [address] [place] [address], [place] [address]. All rights reserved. This information is not intended as a substitute for professional medical care. Always follow your healthcare professional's instructions.

## 2019-10-24 ENCOUNTER — INFUSION (OUTPATIENT)
Dept: INFUSION THERAPY | Facility: HOSPITAL | Age: 59
End: 2019-10-24
Attending: INTERNAL MEDICINE
Payer: COMMERCIAL

## 2019-10-24 VITALS
RESPIRATION RATE: 16 BRPM | SYSTOLIC BLOOD PRESSURE: 135 MMHG | TEMPERATURE: 98 F | DIASTOLIC BLOOD PRESSURE: 90 MMHG | HEART RATE: 80 BPM

## 2019-10-24 DIAGNOSIS — C67.9 MALIGNANT NEOPLASM OF URINARY BLADDER, UNSPECIFIED SITE: Primary | ICD-10-CM

## 2019-10-24 PROCEDURE — 63600175 PHARM REV CODE 636 W HCPCS: Mod: JG,PN | Performed by: NURSE PRACTITIONER

## 2019-10-24 PROCEDURE — 96372 THER/PROPH/DIAG INJ SC/IM: CPT | Mod: PN

## 2019-10-24 RX ADMIN — PEGFILGRASTIM 6 MG: 6 INJECTION SUBCUTANEOUS at 10:10

## 2019-10-28 ENCOUNTER — LAB VISIT (OUTPATIENT)
Dept: LAB | Facility: HOSPITAL | Age: 59
End: 2019-10-28
Attending: INTERNAL MEDICINE
Payer: COMMERCIAL

## 2019-10-28 DIAGNOSIS — C67.9 MALIGNANT NEOPLASM OF URINARY BLADDER, UNSPECIFIED SITE: ICD-10-CM

## 2019-10-28 LAB
ALBUMIN SERPL BCP-MCNC: 3.9 G/DL (ref 3.5–5.2)
ALP SERPL-CCNC: 137 U/L (ref 38–145)
ALT SERPL W/O P-5'-P-CCNC: 15 U/L (ref 10–44)
ANION GAP SERPL CALC-SCNC: 9 MMOL/L (ref 8–16)
ANISOCYTOSIS BLD QL SMEAR: SLIGHT
AST SERPL-CCNC: 17 U/L (ref 17–59)
BASOPHILS # BLD AUTO: 0.07 K/UL (ref 0–0.2)
BASOPHILS NFR BLD: 0.4 % (ref 0–1.9)
BILIRUB SERPL-MCNC: 0.4 MG/DL (ref 0.2–1.3)
BUN SERPL-MCNC: 18 MG/DL (ref 9–21)
CALCIUM SERPL-MCNC: 8.9 MG/DL (ref 8.4–10.2)
CHLORIDE SERPL-SCNC: 101 MMOL/L (ref 95–110)
CO2 SERPL-SCNC: 26 MMOL/L (ref 22–31)
CREAT SERPL-MCNC: 1.32 MG/DL (ref 0.5–1.4)
DIFFERENTIAL METHOD: ABNORMAL
DOHLE BOD BLD QL SMEAR: PRESENT
EOSINOPHIL # BLD AUTO: 0.2 K/UL (ref 0–0.5)
EOSINOPHIL NFR BLD: 0.9 % (ref 0–8)
ERYTHROCYTE [DISTWIDTH] IN BLOOD BY AUTOMATED COUNT: 16.2 % (ref 11.5–14.5)
EST. GFR  (AFRICAN AMERICAN): >60 ML/MIN/1.73 M^2
EST. GFR  (NON AFRICAN AMERICAN): 58 ML/MIN/1.73 M^2
GLUCOSE SERPL-MCNC: 194 MG/DL (ref 70–110)
HCT VFR BLD AUTO: 37 % (ref 40–54)
HGB BLD-MCNC: 11.9 G/DL (ref 14–18)
IMM GRANULOCYTES # BLD AUTO: 0.06 K/UL (ref 0–0.04)
IMM GRANULOCYTES NFR BLD AUTO: 0.4 % (ref 0–0.5)
LYMPHOCYTES # BLD AUTO: 1.3 K/UL (ref 1–4.8)
LYMPHOCYTES NFR BLD: 7.6 % (ref 18–48)
MAGNESIUM SERPL-MCNC: 2 MG/DL (ref 1.6–2.6)
MCH RBC QN AUTO: 26.8 PG (ref 27–31)
MCHC RBC AUTO-ENTMCNC: 32.2 G/DL (ref 32–36)
MCV RBC AUTO: 83 FL (ref 82–98)
MONOCYTES # BLD AUTO: 0.3 K/UL (ref 0.3–1)
MONOCYTES NFR BLD: 1.8 % (ref 4–15)
NEUTROPHILS # BLD AUTO: 15.2 K/UL (ref 1.8–7.7)
NEUTROPHILS NFR BLD: 88.9 % (ref 38–73)
NRBC BLD-RTO: 0 /100 WBC
PLATELET # BLD AUTO: 161 K/UL (ref 150–350)
PMV BLD AUTO: 9.1 FL (ref 9.2–12.9)
POTASSIUM SERPL-SCNC: 4.5 MMOL/L (ref 3.5–5.1)
PROT SERPL-MCNC: 6.6 G/DL (ref 6–8.4)
RBC # BLD AUTO: 4.44 M/UL (ref 4.6–6.2)
SODIUM SERPL-SCNC: 136 MMOL/L (ref 136–145)
TOXIC GRANULES BLD QL SMEAR: PRESENT
WBC # BLD AUTO: 17.06 K/UL (ref 3.9–12.7)

## 2019-10-28 PROCEDURE — 85025 COMPLETE CBC W/AUTO DIFF WBC: CPT | Mod: PN

## 2019-10-28 PROCEDURE — 85025 COMPLETE CBC W/AUTO DIFF WBC: CPT

## 2019-10-28 PROCEDURE — 36415 COLL VENOUS BLD VENIPUNCTURE: CPT | Mod: PN

## 2019-10-28 PROCEDURE — 80053 COMPREHEN METABOLIC PANEL: CPT | Mod: PN

## 2019-10-28 PROCEDURE — 80053 COMPREHEN METABOLIC PANEL: CPT

## 2019-10-28 PROCEDURE — 83735 ASSAY OF MAGNESIUM: CPT

## 2019-10-28 PROCEDURE — 83735 ASSAY OF MAGNESIUM: CPT | Mod: PN

## 2019-11-04 ENCOUNTER — LAB VISIT (OUTPATIENT)
Dept: LAB | Facility: HOSPITAL | Age: 59
End: 2019-11-04
Attending: INTERNAL MEDICINE
Payer: COMMERCIAL

## 2019-11-04 DIAGNOSIS — C67.9 MALIGNANT NEOPLASM OF URINARY BLADDER, UNSPECIFIED SITE: ICD-10-CM

## 2019-11-04 LAB
ANISOCYTOSIS BLD QL SMEAR: SLIGHT
BASOPHILS NFR BLD: 0 % (ref 0–1.9)
DIFFERENTIAL METHOD: ABNORMAL
EOSINOPHIL NFR BLD: 0 % (ref 0–8)
ERYTHROCYTE [DISTWIDTH] IN BLOOD BY AUTOMATED COUNT: 18.3 % (ref 11.5–14.5)
HCT VFR BLD AUTO: 37.7 % (ref 40–54)
HGB BLD-MCNC: 12 G/DL (ref 14–18)
IMM GRANULOCYTES # BLD AUTO: ABNORMAL K/UL (ref 0–0.04)
IMM GRANULOCYTES NFR BLD AUTO: ABNORMAL % (ref 0–0.5)
LYMPHOCYTES NFR BLD: 11 % (ref 18–48)
MCH RBC QN AUTO: 26.8 PG (ref 27–31)
MCHC RBC AUTO-ENTMCNC: 31.8 G/DL (ref 32–36)
MCV RBC AUTO: 84 FL (ref 82–98)
MONOCYTES NFR BLD: 7 % (ref 4–15)
NEUTROPHILS # BLD AUTO: 12.9 K/UL (ref 1.8–7.7)
NEUTROPHILS NFR BLD: 79 % (ref 38–73)
NEUTS BAND NFR BLD MANUAL: 3 %
NRBC BLD-RTO: 0 /100 WBC
OVALOCYTES BLD QL SMEAR: ABNORMAL
PLATELET # BLD AUTO: 194 K/UL (ref 150–350)
PLATELET BLD QL SMEAR: ABNORMAL
PMV BLD AUTO: 9.7 FL (ref 9.2–12.9)
POLYCHROMASIA BLD QL SMEAR: ABNORMAL
RBC # BLD AUTO: 4.47 M/UL (ref 4.6–6.2)
WBC # BLD AUTO: 15.75 K/UL (ref 3.9–12.7)

## 2019-11-04 PROCEDURE — 36415 COLL VENOUS BLD VENIPUNCTURE: CPT | Mod: PN

## 2019-11-04 PROCEDURE — 85027 COMPLETE CBC AUTOMATED: CPT

## 2019-11-04 PROCEDURE — 85007 BL SMEAR W/DIFF WBC COUNT: CPT

## 2019-11-04 PROCEDURE — 85007 BL SMEAR W/DIFF WBC COUNT: CPT | Mod: PN

## 2019-11-04 PROCEDURE — 85027 COMPLETE CBC AUTOMATED: CPT | Mod: PN

## 2019-11-06 ENCOUNTER — INFUSION (OUTPATIENT)
Dept: INFUSION THERAPY | Facility: HOSPITAL | Age: 59
End: 2019-11-06
Attending: INTERNAL MEDICINE
Payer: COMMERCIAL

## 2019-11-06 VITALS
DIASTOLIC BLOOD PRESSURE: 80 MMHG | SYSTOLIC BLOOD PRESSURE: 117 MMHG | RESPIRATION RATE: 18 BRPM | BODY MASS INDEX: 34.78 KG/M2 | HEIGHT: 67 IN | TEMPERATURE: 98 F | WEIGHT: 221.56 LBS | HEART RATE: 74 BPM

## 2019-11-06 DIAGNOSIS — C67.9 MALIGNANT NEOPLASM OF URINARY BLADDER, UNSPECIFIED SITE: Primary | ICD-10-CM

## 2019-11-06 PROCEDURE — 96367 TX/PROPH/DG ADDL SEQ IV INF: CPT | Mod: PN

## 2019-11-06 PROCEDURE — 63600175 PHARM REV CODE 636 W HCPCS: Mod: JG,PN | Performed by: PHYSICIAN ASSISTANT

## 2019-11-06 PROCEDURE — 96413 CHEMO IV INFUSION 1 HR: CPT | Mod: PN

## 2019-11-06 PROCEDURE — 25000003 PHARM REV CODE 250: Mod: PN | Performed by: PHYSICIAN ASSISTANT

## 2019-11-06 PROCEDURE — 96366 THER/PROPH/DIAG IV INF ADDON: CPT | Mod: PN

## 2019-11-06 PROCEDURE — A4216 STERILE WATER/SALINE, 10 ML: HCPCS | Mod: PN | Performed by: PHYSICIAN ASSISTANT

## 2019-11-06 PROCEDURE — 96417 CHEMO IV INFUS EACH ADDL SEQ: CPT | Mod: PN

## 2019-11-06 RX ORDER — SODIUM CHLORIDE 0.9 % (FLUSH) 0.9 %
10 SYRINGE (ML) INJECTION
Status: DISCONTINUED | OUTPATIENT
Start: 2019-11-06 | End: 2019-11-06 | Stop reason: HOSPADM

## 2019-11-06 RX ADMIN — APREPITANT 130 MG: 130 INJECTION, EMULSION INTRAVENOUS at 11:11

## 2019-11-06 RX ADMIN — DEXAMETHASONE SODIUM PHOSPHATE: 4 INJECTION, SOLUTION INTRA-ARTICULAR; INTRALESIONAL; INTRAMUSCULAR; INTRAVENOUS; SOFT TISSUE at 11:11

## 2019-11-06 RX ADMIN — POTASSIUM CHLORIDE: 2 INJECTION, SOLUTION, CONCENTRATE INTRAVENOUS at 01:11

## 2019-11-06 RX ADMIN — GEMCITABINE 2150 MG: 38 INJECTION, SOLUTION INTRAVENOUS at 11:11

## 2019-11-06 RX ADMIN — Medication 10 ML: at 09:11

## 2019-11-06 RX ADMIN — POTASSIUM CHLORIDE: 2 INJECTION, SOLUTION, CONCENTRATE INTRAVENOUS at 09:11

## 2019-11-06 RX ADMIN — CISPLATIN 75 MG: 100 INJECTION, SOLUTION INTRAVENOUS at 12:11

## 2019-11-06 NOTE — PLAN OF CARE
Tolerated treatment well, VSS, schedule reviewed with pt, ambulated from infusion center, no distress noted

## 2019-11-12 ENCOUNTER — LAB VISIT (OUTPATIENT)
Dept: LAB | Facility: HOSPITAL | Age: 59
End: 2019-11-12
Attending: PHYSICIAN ASSISTANT
Payer: COMMERCIAL

## 2019-11-12 DIAGNOSIS — C67.9 MALIGNANT NEOPLASM OF URINARY BLADDER, UNSPECIFIED SITE: ICD-10-CM

## 2019-11-12 LAB
ALBUMIN SERPL BCP-MCNC: 4.3 G/DL (ref 3.5–5.2)
ALP SERPL-CCNC: 94 U/L (ref 38–145)
ALT SERPL W/O P-5'-P-CCNC: 27 U/L (ref 10–44)
ANION GAP SERPL CALC-SCNC: 11 MMOL/L (ref 8–16)
AST SERPL-CCNC: 25 U/L (ref 17–59)
BASOPHILS # BLD AUTO: 0.07 K/UL (ref 0–0.2)
BASOPHILS NFR BLD: 1.2 % (ref 0–1.9)
BILIRUB SERPL-MCNC: 0.8 MG/DL (ref 0.2–1.3)
BUN SERPL-MCNC: 18 MG/DL (ref 9–21)
CALCIUM SERPL-MCNC: 9.1 MG/DL (ref 8.4–10.2)
CHLORIDE SERPL-SCNC: 107 MMOL/L (ref 95–110)
CO2 SERPL-SCNC: 21 MMOL/L (ref 22–31)
CREAT SERPL-MCNC: 1.26 MG/DL (ref 0.5–1.4)
DIFFERENTIAL METHOD: ABNORMAL
EOSINOPHIL # BLD AUTO: 0.1 K/UL (ref 0–0.5)
EOSINOPHIL NFR BLD: 1.5 % (ref 0–8)
ERYTHROCYTE [DISTWIDTH] IN BLOOD BY AUTOMATED COUNT: 18.3 % (ref 11.5–14.5)
EST. GFR  (AFRICAN AMERICAN): >60 ML/MIN/1.73 M^2
EST. GFR  (NON AFRICAN AMERICAN): >60 ML/MIN/1.73 M^2
GLUCOSE SERPL-MCNC: 115 MG/DL (ref 70–110)
HCT VFR BLD AUTO: 38.7 % (ref 40–54)
HGB BLD-MCNC: 12.5 G/DL (ref 14–18)
IMM GRANULOCYTES # BLD AUTO: 0.03 K/UL (ref 0–0.04)
IMM GRANULOCYTES NFR BLD AUTO: 0.5 % (ref 0–0.5)
LYMPHOCYTES # BLD AUTO: 1.2 K/UL (ref 1–4.8)
LYMPHOCYTES NFR BLD: 19.6 % (ref 18–48)
MAGNESIUM SERPL-MCNC: 2.1 MG/DL (ref 1.6–2.6)
MCH RBC QN AUTO: 27.4 PG (ref 27–31)
MCHC RBC AUTO-ENTMCNC: 32.3 G/DL (ref 32–36)
MCV RBC AUTO: 85 FL (ref 82–98)
MONOCYTES # BLD AUTO: 0.3 K/UL (ref 0.3–1)
MONOCYTES NFR BLD: 5.5 % (ref 4–15)
NEUTROPHILS # BLD AUTO: 4.3 K/UL (ref 1.8–7.7)
NEUTROPHILS NFR BLD: 71.7 % (ref 38–73)
NRBC BLD-RTO: 0 /100 WBC
PLATELET # BLD AUTO: 337 K/UL (ref 150–350)
PMV BLD AUTO: 9.4 FL (ref 9.2–12.9)
POTASSIUM SERPL-SCNC: 4 MMOL/L (ref 3.5–5.1)
PROT SERPL-MCNC: 7 G/DL (ref 6–8.4)
RBC # BLD AUTO: 4.57 M/UL (ref 4.6–6.2)
SODIUM SERPL-SCNC: 139 MMOL/L (ref 136–145)
WBC # BLD AUTO: 6.01 K/UL (ref 3.9–12.7)

## 2019-11-12 PROCEDURE — 80053 COMPREHEN METABOLIC PANEL: CPT

## 2019-11-12 PROCEDURE — 85025 COMPLETE CBC W/AUTO DIFF WBC: CPT | Mod: PN

## 2019-11-12 PROCEDURE — 36415 COLL VENOUS BLD VENIPUNCTURE: CPT | Mod: PN

## 2019-11-12 PROCEDURE — 85025 COMPLETE CBC W/AUTO DIFF WBC: CPT

## 2019-11-12 PROCEDURE — 80053 COMPREHEN METABOLIC PANEL: CPT | Mod: PN

## 2019-11-12 PROCEDURE — 83735 ASSAY OF MAGNESIUM: CPT

## 2019-11-12 PROCEDURE — 83735 ASSAY OF MAGNESIUM: CPT | Mod: PN

## 2019-11-13 ENCOUNTER — INFUSION (OUTPATIENT)
Dept: INFUSION THERAPY | Facility: HOSPITAL | Age: 59
End: 2019-11-13
Attending: INTERNAL MEDICINE
Payer: COMMERCIAL

## 2019-11-13 VITALS
HEIGHT: 67 IN | DIASTOLIC BLOOD PRESSURE: 62 MMHG | TEMPERATURE: 98 F | WEIGHT: 220 LBS | SYSTOLIC BLOOD PRESSURE: 132 MMHG | RESPIRATION RATE: 18 BRPM | HEART RATE: 89 BPM | BODY MASS INDEX: 34.53 KG/M2 | OXYGEN SATURATION: 100 %

## 2019-11-13 DIAGNOSIS — C67.9 MALIGNANT NEOPLASM OF URINARY BLADDER, UNSPECIFIED SITE: Primary | ICD-10-CM

## 2019-11-13 PROCEDURE — 63600175 PHARM REV CODE 636 W HCPCS: Mod: PN | Performed by: PHYSICIAN ASSISTANT

## 2019-11-13 PROCEDURE — 96366 THER/PROPH/DIAG IV INF ADDON: CPT | Mod: PN

## 2019-11-13 PROCEDURE — 96413 CHEMO IV INFUSION 1 HR: CPT | Mod: PN

## 2019-11-13 PROCEDURE — 96417 CHEMO IV INFUS EACH ADDL SEQ: CPT | Mod: PN

## 2019-11-13 PROCEDURE — 96367 TX/PROPH/DG ADDL SEQ IV INF: CPT | Mod: PN

## 2019-11-13 PROCEDURE — 63600175 PHARM REV CODE 636 W HCPCS: Mod: PN | Performed by: INTERNAL MEDICINE

## 2019-11-13 RX ORDER — DIPHENHYDRAMINE HYDROCHLORIDE 50 MG/ML
50 INJECTION INTRAMUSCULAR; INTRAVENOUS ONCE AS NEEDED
Status: DISCONTINUED | OUTPATIENT
Start: 2019-11-13 | End: 2019-11-13 | Stop reason: HOSPADM

## 2019-11-13 RX ORDER — SODIUM CHLORIDE 0.9 % (FLUSH) 0.9 %
10 SYRINGE (ML) INJECTION
Status: DISCONTINUED | OUTPATIENT
Start: 2019-11-13 | End: 2019-11-13 | Stop reason: HOSPADM

## 2019-11-13 RX ORDER — METHYLPREDNISOLONE SOD SUCC 125 MG
125 VIAL (EA) INJECTION ONCE AS NEEDED
Status: DISCONTINUED | OUTPATIENT
Start: 2019-11-13 | End: 2019-11-13 | Stop reason: HOSPADM

## 2019-11-13 RX ORDER — HEPARIN 100 UNIT/ML
500 SYRINGE INTRAVENOUS
Status: DISCONTINUED | OUTPATIENT
Start: 2019-11-13 | End: 2019-11-13 | Stop reason: HOSPADM

## 2019-11-13 RX ORDER — FAMOTIDINE 10 MG/ML
20 INJECTION INTRAVENOUS ONCE AS NEEDED
Status: DISCONTINUED | OUTPATIENT
Start: 2019-11-13 | End: 2019-11-13 | Stop reason: HOSPADM

## 2019-11-13 RX ADMIN — POTASSIUM CHLORIDE: 2 INJECTION, SOLUTION, CONCENTRATE INTRAVENOUS at 09:11

## 2019-11-13 RX ADMIN — APREPITANT 130 MG: 130 INJECTION, EMULSION INTRAVENOUS at 11:11

## 2019-11-13 RX ADMIN — POTASSIUM CHLORIDE: 2 INJECTION, SOLUTION, CONCENTRATE INTRAVENOUS at 02:11

## 2019-11-13 RX ADMIN — CISPLATIN 75 MG: 100 INJECTION, SOLUTION INTRAVENOUS at 01:11

## 2019-11-13 RX ADMIN — SODIUM CHLORIDE: 9 INJECTION, SOLUTION INTRAVENOUS at 09:11

## 2019-11-13 RX ADMIN — DEXAMETHASONE SODIUM PHOSPHATE: 4 INJECTION, SOLUTION INTRA-ARTICULAR; INTRALESIONAL; INTRAMUSCULAR; INTRAVENOUS; SOFT TISSUE at 11:11

## 2019-11-13 RX ADMIN — GEMCITABINE 2150 MG: 38 INJECTION, SOLUTION INTRAVENOUS at 12:11

## 2019-11-13 NOTE — PLAN OF CARE
Tolerated treatment well today. Copy of scheduled and avs given. Ambulated to private vehicle without difficulty  NAD

## 2019-11-13 NOTE — PLAN OF CARE
Pt states he gets waves of nausea but he only get 9 pill filled at a time  encouraged him to call office and see if they can over ride the amount

## 2019-11-13 NOTE — PLAN OF CARE
Tolerated treatment well.  Discharge instructions given Verbally acknowledged understanding. Offered avs and copy of scheduled . Ambulated to private vehicle without difficulty.  NAD

## 2019-11-14 ENCOUNTER — INFUSION (OUTPATIENT)
Dept: INFUSION THERAPY | Facility: HOSPITAL | Age: 59
End: 2019-11-14
Attending: INTERNAL MEDICINE
Payer: COMMERCIAL

## 2019-11-14 VITALS — RESPIRATION RATE: 14 BRPM | HEART RATE: 96 BPM | SYSTOLIC BLOOD PRESSURE: 130 MMHG | DIASTOLIC BLOOD PRESSURE: 84 MMHG

## 2019-11-14 DIAGNOSIS — C67.9 MALIGNANT NEOPLASM OF URINARY BLADDER, UNSPECIFIED SITE: Primary | ICD-10-CM

## 2019-11-14 PROCEDURE — 96372 THER/PROPH/DIAG INJ SC/IM: CPT | Mod: PN

## 2019-11-14 PROCEDURE — 63600175 PHARM REV CODE 636 W HCPCS: Mod: JG,PN | Performed by: PHYSICIAN ASSISTANT

## 2019-11-14 RX ADMIN — PEGFILGRASTIM 6 MG: 6 INJECTION SUBCUTANEOUS at 09:11

## 2019-11-19 ENCOUNTER — LAB VISIT (OUTPATIENT)
Dept: LAB | Facility: HOSPITAL | Age: 59
End: 2019-11-19
Attending: INTERNAL MEDICINE
Payer: COMMERCIAL

## 2019-11-19 DIAGNOSIS — C67.9 MALIGNANT NEOPLASM OF URINARY BLADDER, UNSPECIFIED SITE: ICD-10-CM

## 2019-11-19 LAB
ANISOCYTOSIS BLD QL SMEAR: SLIGHT
BASOPHILS NFR BLD: 0 % (ref 0–1.9)
DIFFERENTIAL METHOD: ABNORMAL
DOHLE BOD BLD QL SMEAR: PRESENT
EOSINOPHIL NFR BLD: 0 % (ref 0–8)
ERYTHROCYTE [DISTWIDTH] IN BLOOD BY AUTOMATED COUNT: 19 % (ref 11.5–14.5)
HCT VFR BLD AUTO: 35.2 % (ref 40–54)
HGB BLD-MCNC: 11.1 G/DL (ref 14–18)
IMM GRANULOCYTES # BLD AUTO: ABNORMAL K/UL (ref 0–0.04)
IMM GRANULOCYTES NFR BLD AUTO: ABNORMAL % (ref 0–0.5)
LYMPHOCYTES NFR BLD: 6 % (ref 18–48)
MCH RBC QN AUTO: 27.5 PG (ref 27–31)
MCHC RBC AUTO-ENTMCNC: 31.5 G/DL (ref 32–36)
MCV RBC AUTO: 87 FL (ref 82–98)
METAMYELOCYTES NFR BLD MANUAL: 3 %
MONOCYTES NFR BLD: 5 % (ref 4–15)
MYELOCYTES NFR BLD MANUAL: 4 %
NEUTROPHILS # BLD AUTO: 25.1 K/UL (ref 1.8–7.7)
NEUTROPHILS NFR BLD: 63 % (ref 38–73)
NEUTS BAND NFR BLD MANUAL: 19 %
NRBC BLD-RTO: 0 /100 WBC
PLATELET # BLD AUTO: 142 K/UL (ref 150–350)
PMV BLD AUTO: 10 FL (ref 9.2–12.9)
POLYCHROMASIA BLD QL SMEAR: ABNORMAL
RBC # BLD AUTO: 4.03 M/UL (ref 4.6–6.2)
WBC # BLD AUTO: 30.58 K/UL (ref 3.9–12.7)

## 2019-11-19 PROCEDURE — 85007 BL SMEAR W/DIFF WBC COUNT: CPT | Mod: PN

## 2019-11-19 PROCEDURE — 85007 BL SMEAR W/DIFF WBC COUNT: CPT

## 2019-11-19 PROCEDURE — 85027 COMPLETE CBC AUTOMATED: CPT | Mod: PN

## 2019-11-19 PROCEDURE — 36415 COLL VENOUS BLD VENIPUNCTURE: CPT | Mod: PN

## 2019-11-19 PROCEDURE — 85027 COMPLETE CBC AUTOMATED: CPT

## 2019-11-26 ENCOUNTER — LAB VISIT (OUTPATIENT)
Dept: LAB | Facility: HOSPITAL | Age: 59
End: 2019-11-26
Attending: INTERNAL MEDICINE
Payer: COMMERCIAL

## 2019-11-26 DIAGNOSIS — C67.9 MALIGNANT NEOPLASM OF URINARY BLADDER, UNSPECIFIED SITE: ICD-10-CM

## 2019-11-26 LAB
ALBUMIN SERPL BCP-MCNC: 4.2 G/DL (ref 3.5–5.2)
ALP SERPL-CCNC: 121 U/L (ref 38–145)
ALT SERPL W/O P-5'-P-CCNC: 18 U/L (ref 10–44)
ANION GAP SERPL CALC-SCNC: 10 MMOL/L (ref 8–16)
AST SERPL-CCNC: 19 U/L (ref 17–59)
BASOPHILS # BLD AUTO: 0.08 K/UL (ref 0–0.2)
BASOPHILS NFR BLD: 0.5 % (ref 0–1.9)
BILIRUB SERPL-MCNC: 0.4 MG/DL (ref 0.2–1.3)
BUN SERPL-MCNC: 18 MG/DL (ref 9–21)
CALCIUM SERPL-MCNC: 8.8 MG/DL (ref 8.4–10.2)
CHLORIDE SERPL-SCNC: 110 MMOL/L (ref 95–110)
CO2 SERPL-SCNC: 18 MMOL/L (ref 22–31)
CREAT SERPL-MCNC: 1.37 MG/DL (ref 0.5–1.4)
DIFFERENTIAL METHOD: ABNORMAL
EOSINOPHIL # BLD AUTO: 0.2 K/UL (ref 0–0.5)
EOSINOPHIL NFR BLD: 1 % (ref 0–8)
ERYTHROCYTE [DISTWIDTH] IN BLOOD BY AUTOMATED COUNT: 20.7 % (ref 11.5–14.5)
EST. GFR  (AFRICAN AMERICAN): >60 ML/MIN/1.73 M^2
EST. GFR  (NON AFRICAN AMERICAN): 56 ML/MIN/1.73 M^2
GLUCOSE SERPL-MCNC: 108 MG/DL (ref 70–110)
HCT VFR BLD AUTO: 35.6 % (ref 40–54)
HGB BLD-MCNC: 11.4 G/DL (ref 14–18)
IMM GRANULOCYTES # BLD AUTO: 0.68 K/UL (ref 0–0.04)
IMM GRANULOCYTES NFR BLD AUTO: 4.1 % (ref 0–0.5)
LYMPHOCYTES # BLD AUTO: 1.1 K/UL (ref 1–4.8)
LYMPHOCYTES NFR BLD: 6.3 % (ref 18–48)
MAGNESIUM SERPL-MCNC: 2 MG/DL (ref 1.6–2.6)
MCH RBC QN AUTO: 28.1 PG (ref 27–31)
MCHC RBC AUTO-ENTMCNC: 32 G/DL (ref 32–36)
MCV RBC AUTO: 88 FL (ref 82–98)
MONOCYTES # BLD AUTO: 0.9 K/UL (ref 0.3–1)
MONOCYTES NFR BLD: 5.4 % (ref 4–15)
NEUTROPHILS # BLD AUTO: 13.8 K/UL (ref 1.8–7.7)
NEUTROPHILS NFR BLD: 82.7 % (ref 38–73)
NRBC BLD-RTO: 0 /100 WBC
PLATELET # BLD AUTO: 209 K/UL (ref 150–350)
PMV BLD AUTO: 9.9 FL (ref 9.2–12.9)
POTASSIUM SERPL-SCNC: 3.7 MMOL/L (ref 3.5–5.1)
PROT SERPL-MCNC: 6.7 G/DL (ref 6–8.4)
RBC # BLD AUTO: 4.06 M/UL (ref 4.6–6.2)
SODIUM SERPL-SCNC: 138 MMOL/L (ref 136–145)
WBC # BLD AUTO: 16.72 K/UL (ref 3.9–12.7)

## 2019-11-26 PROCEDURE — 80053 COMPREHEN METABOLIC PANEL: CPT

## 2019-11-26 PROCEDURE — 85025 COMPLETE CBC W/AUTO DIFF WBC: CPT | Mod: PN

## 2019-11-26 PROCEDURE — 83735 ASSAY OF MAGNESIUM: CPT

## 2019-11-26 PROCEDURE — 85025 COMPLETE CBC W/AUTO DIFF WBC: CPT

## 2019-11-26 PROCEDURE — 80053 COMPREHEN METABOLIC PANEL: CPT | Mod: PN

## 2019-11-26 PROCEDURE — 36415 COLL VENOUS BLD VENIPUNCTURE: CPT | Mod: PN

## 2019-11-26 PROCEDURE — 83735 ASSAY OF MAGNESIUM: CPT | Mod: PN

## 2019-11-27 ENCOUNTER — INFUSION (OUTPATIENT)
Dept: INFUSION THERAPY | Facility: HOSPITAL | Age: 59
End: 2019-11-27
Attending: INTERNAL MEDICINE
Payer: COMMERCIAL

## 2019-11-27 VITALS
WEIGHT: 221 LBS | TEMPERATURE: 98 F | SYSTOLIC BLOOD PRESSURE: 112 MMHG | DIASTOLIC BLOOD PRESSURE: 71 MMHG | HEART RATE: 78 BPM | OXYGEN SATURATION: 98 % | BODY MASS INDEX: 34.69 KG/M2 | RESPIRATION RATE: 16 BRPM | HEIGHT: 67 IN

## 2019-11-27 DIAGNOSIS — C67.9 MALIGNANT NEOPLASM OF URINARY BLADDER, UNSPECIFIED SITE: Primary | ICD-10-CM

## 2019-11-27 PROCEDURE — 96375 TX/PRO/DX INJ NEW DRUG ADDON: CPT | Mod: PN

## 2019-11-27 PROCEDURE — 96367 TX/PROPH/DG ADDL SEQ IV INF: CPT | Mod: PN

## 2019-11-27 PROCEDURE — 96413 CHEMO IV INFUSION 1 HR: CPT | Mod: PN

## 2019-11-27 PROCEDURE — 96366 THER/PROPH/DIAG IV INF ADDON: CPT | Mod: PN

## 2019-11-27 PROCEDURE — 25000003 PHARM REV CODE 250: Mod: PN | Performed by: PHYSICIAN ASSISTANT

## 2019-11-27 PROCEDURE — 96417 CHEMO IV INFUS EACH ADDL SEQ: CPT | Mod: PN

## 2019-11-27 PROCEDURE — 63600175 PHARM REV CODE 636 W HCPCS: Mod: JG,PN | Performed by: PHYSICIAN ASSISTANT

## 2019-11-27 PROCEDURE — A4216 STERILE WATER/SALINE, 10 ML: HCPCS | Mod: PN | Performed by: PHYSICIAN ASSISTANT

## 2019-11-27 RX ORDER — FAMOTIDINE 10 MG/ML
20 INJECTION INTRAVENOUS ONCE AS NEEDED
Status: DISCONTINUED | OUTPATIENT
Start: 2019-11-27 | End: 2019-11-27 | Stop reason: HOSPADM

## 2019-11-27 RX ORDER — HEPARIN 100 UNIT/ML
500 SYRINGE INTRAVENOUS
Status: DISCONTINUED | OUTPATIENT
Start: 2019-11-27 | End: 2019-11-27 | Stop reason: HOSPADM

## 2019-11-27 RX ORDER — DIPHENHYDRAMINE HYDROCHLORIDE 50 MG/ML
50 INJECTION INTRAMUSCULAR; INTRAVENOUS ONCE AS NEEDED
Status: DISCONTINUED | OUTPATIENT
Start: 2019-11-27 | End: 2019-11-27 | Stop reason: HOSPADM

## 2019-11-27 RX ORDER — SODIUM CHLORIDE 0.9 % (FLUSH) 0.9 %
10 SYRINGE (ML) INJECTION
Status: DISCONTINUED | OUTPATIENT
Start: 2019-11-27 | End: 2019-11-27 | Stop reason: HOSPADM

## 2019-11-27 RX ORDER — METHYLPREDNISOLONE SOD SUCC 125 MG
125 VIAL (EA) INJECTION ONCE AS NEEDED
Status: DISCONTINUED | OUTPATIENT
Start: 2019-11-27 | End: 2019-11-27 | Stop reason: HOSPADM

## 2019-11-27 RX ADMIN — CISPLATIN 75 MG: 100 INJECTION, SOLUTION INTRAVENOUS at 12:11

## 2019-11-27 RX ADMIN — POTASSIUM CHLORIDE: 2 INJECTION, SOLUTION, CONCENTRATE INTRAVENOUS at 01:11

## 2019-11-27 RX ADMIN — APREPITANT 130 MG: 130 INJECTION, EMULSION INTRAVENOUS at 10:11

## 2019-11-27 RX ADMIN — GEMCITABINE 2150 MG: 38 INJECTION, SOLUTION INTRAVENOUS at 11:11

## 2019-11-27 RX ADMIN — Medication 10 ML: at 08:11

## 2019-11-27 RX ADMIN — POTASSIUM CHLORIDE: 2 INJECTION, SOLUTION, CONCENTRATE INTRAVENOUS at 09:11

## 2019-11-27 RX ADMIN — DEXAMETHASONE SODIUM PHOSPHATE: 4 INJECTION, SOLUTION INTRA-ARTICULAR; INTRALESIONAL; INTRAMUSCULAR; INTRAVENOUS; SOFT TISSUE at 10:11

## 2019-11-27 NOTE — PLAN OF CARE
Tolerated treatment well today  Discharge instructions given Verbally acknowledged understanding  Ambulated to private vehicle without difficulty  NAD

## 2019-12-03 ENCOUNTER — LAB VISIT (OUTPATIENT)
Dept: LAB | Facility: HOSPITAL | Age: 59
End: 2019-12-03
Attending: NURSE PRACTITIONER
Payer: COMMERCIAL

## 2019-12-03 DIAGNOSIS — C67.9 MALIGNANT NEOPLASM OF URINARY BLADDER, UNSPECIFIED SITE: ICD-10-CM

## 2019-12-03 LAB
ANISOCYTOSIS BLD QL SMEAR: SLIGHT
BASOPHILS # BLD AUTO: 0.07 K/UL (ref 0–0.2)
BASOPHILS NFR BLD: 1.2 % (ref 0–1.9)
DIFFERENTIAL METHOD: ABNORMAL
EOSINOPHIL # BLD AUTO: 0.1 K/UL (ref 0–0.5)
EOSINOPHIL NFR BLD: 1.7 % (ref 0–8)
ERYTHROCYTE [DISTWIDTH] IN BLOOD BY AUTOMATED COUNT: 19.8 % (ref 11.5–14.5)
HCT VFR BLD AUTO: 33.6 % (ref 40–54)
HGB BLD-MCNC: 10.9 G/DL (ref 14–18)
HYPOCHROMIA BLD QL SMEAR: ABNORMAL
IMM GRANULOCYTES # BLD AUTO: 0.04 K/UL (ref 0–0.04)
IMM GRANULOCYTES NFR BLD AUTO: 0.7 % (ref 0–0.5)
LYMPHOCYTES # BLD AUTO: 1.1 K/UL (ref 1–4.8)
LYMPHOCYTES NFR BLD: 19.2 % (ref 18–48)
MCH RBC QN AUTO: 28.7 PG (ref 27–31)
MCHC RBC AUTO-ENTMCNC: 32.4 G/DL (ref 32–36)
MCV RBC AUTO: 88 FL (ref 82–98)
MONOCYTES # BLD AUTO: 0.4 K/UL (ref 0.3–1)
MONOCYTES NFR BLD: 6.6 % (ref 4–15)
NEUTROPHILS # BLD AUTO: 4.1 K/UL (ref 1.8–7.7)
NEUTROPHILS NFR BLD: 70.6 % (ref 38–73)
NRBC BLD-RTO: 0 /100 WBC
OVALOCYTES BLD QL SMEAR: ABNORMAL
PLATELET # BLD AUTO: 295 K/UL (ref 150–350)
PLATELET BLD QL SMEAR: ABNORMAL
PMV BLD AUTO: 8.9 FL (ref 9.2–12.9)
RBC # BLD AUTO: 3.8 M/UL (ref 4.6–6.2)
WBC # BLD AUTO: 5.78 K/UL (ref 3.9–12.7)

## 2019-12-03 PROCEDURE — 36415 COLL VENOUS BLD VENIPUNCTURE: CPT | Mod: PN

## 2019-12-03 PROCEDURE — 85025 COMPLETE CBC W/AUTO DIFF WBC: CPT

## 2019-12-03 PROCEDURE — 85025 COMPLETE CBC W/AUTO DIFF WBC: CPT | Mod: PN

## 2019-12-04 ENCOUNTER — INFUSION (OUTPATIENT)
Dept: INFUSION THERAPY | Facility: HOSPITAL | Age: 59
End: 2019-12-04
Attending: INTERNAL MEDICINE
Payer: COMMERCIAL

## 2019-12-04 VITALS
TEMPERATURE: 98 F | HEIGHT: 67 IN | RESPIRATION RATE: 18 BRPM | BODY MASS INDEX: 34.69 KG/M2 | SYSTOLIC BLOOD PRESSURE: 119 MMHG | DIASTOLIC BLOOD PRESSURE: 75 MMHG | WEIGHT: 221 LBS | HEART RATE: 98 BPM | OXYGEN SATURATION: 98 %

## 2019-12-04 DIAGNOSIS — C67.9 MALIGNANT NEOPLASM OF URINARY BLADDER, UNSPECIFIED SITE: Primary | ICD-10-CM

## 2019-12-04 PROCEDURE — 96375 TX/PRO/DX INJ NEW DRUG ADDON: CPT | Mod: PN

## 2019-12-04 PROCEDURE — 96417 CHEMO IV INFUS EACH ADDL SEQ: CPT | Mod: PN

## 2019-12-04 PROCEDURE — A4216 STERILE WATER/SALINE, 10 ML: HCPCS | Mod: PN | Performed by: NURSE PRACTITIONER

## 2019-12-04 PROCEDURE — 25000003 PHARM REV CODE 250: Mod: PN | Performed by: NURSE PRACTITIONER

## 2019-12-04 PROCEDURE — 96367 TX/PROPH/DG ADDL SEQ IV INF: CPT | Mod: PN

## 2019-12-04 PROCEDURE — 96366 THER/PROPH/DIAG IV INF ADDON: CPT | Mod: PN

## 2019-12-04 PROCEDURE — 63600175 PHARM REV CODE 636 W HCPCS: Mod: PN | Performed by: NURSE PRACTITIONER

## 2019-12-04 PROCEDURE — 96413 CHEMO IV INFUSION 1 HR: CPT | Mod: PN

## 2019-12-04 RX ORDER — HEPARIN 100 UNIT/ML
500 SYRINGE INTRAVENOUS
Status: DISCONTINUED | OUTPATIENT
Start: 2019-12-04 | End: 2019-12-04 | Stop reason: HOSPADM

## 2019-12-04 RX ORDER — DIPHENHYDRAMINE HYDROCHLORIDE 50 MG/ML
50 INJECTION INTRAMUSCULAR; INTRAVENOUS ONCE AS NEEDED
Status: DISCONTINUED | OUTPATIENT
Start: 2019-12-04 | End: 2019-12-04 | Stop reason: HOSPADM

## 2019-12-04 RX ORDER — FAMOTIDINE 10 MG/ML
20 INJECTION INTRAVENOUS ONCE AS NEEDED
Status: DISCONTINUED | OUTPATIENT
Start: 2019-12-04 | End: 2019-12-04 | Stop reason: HOSPADM

## 2019-12-04 RX ORDER — METHYLPREDNISOLONE SOD SUCC 125 MG
125 VIAL (EA) INJECTION ONCE AS NEEDED
Status: DISCONTINUED | OUTPATIENT
Start: 2019-12-04 | End: 2019-12-04 | Stop reason: HOSPADM

## 2019-12-04 RX ORDER — SODIUM CHLORIDE 0.9 % (FLUSH) 0.9 %
10 SYRINGE (ML) INJECTION
Status: DISCONTINUED | OUTPATIENT
Start: 2019-12-04 | End: 2019-12-04 | Stop reason: HOSPADM

## 2019-12-04 RX ADMIN — CISPLATIN 75 MG: 100 INJECTION, SOLUTION INTRAVENOUS at 11:12

## 2019-12-04 RX ADMIN — POTASSIUM CHLORIDE: 2 INJECTION, SOLUTION, CONCENTRATE INTRAVENOUS at 09:12

## 2019-12-04 RX ADMIN — GEMCITABINE 2150 MG: 38 INJECTION, SOLUTION INTRAVENOUS at 10:12

## 2019-12-04 RX ADMIN — Medication 10 ML: at 09:12

## 2019-12-04 RX ADMIN — POTASSIUM CHLORIDE: 2 INJECTION, SOLUTION, CONCENTRATE INTRAVENOUS at 11:12

## 2019-12-04 RX ADMIN — DEXAMETHASONE SODIUM PHOSPHATE: 4 INJECTION, SOLUTION INTRA-ARTICULAR; INTRALESIONAL; INTRAMUSCULAR; INTRAVENOUS; SOFT TISSUE at 10:12

## 2019-12-04 RX ADMIN — APREPITANT 130 MG: 130 INJECTION, EMULSION INTRAVENOUS at 10:12

## 2019-12-04 NOTE — PLAN OF CARE
Tolerated treatment well today Discharge instructions given Verbally acknowledged understanding  Ambualated to private vehicle without difficulty  NAD

## 2019-12-05 ENCOUNTER — INFUSION (OUTPATIENT)
Dept: INFUSION THERAPY | Facility: HOSPITAL | Age: 59
End: 2019-12-05
Attending: INTERNAL MEDICINE
Payer: COMMERCIAL

## 2019-12-05 VITALS
HEART RATE: 101 BPM | RESPIRATION RATE: 14 BRPM | SYSTOLIC BLOOD PRESSURE: 126 MMHG | DIASTOLIC BLOOD PRESSURE: 73 MMHG | TEMPERATURE: 98 F

## 2019-12-05 DIAGNOSIS — C67.9 MALIGNANT NEOPLASM OF URINARY BLADDER, UNSPECIFIED SITE: Primary | ICD-10-CM

## 2019-12-05 PROCEDURE — 63600175 PHARM REV CODE 636 W HCPCS: Mod: JG,PN | Performed by: NURSE PRACTITIONER

## 2019-12-05 PROCEDURE — 96372 THER/PROPH/DIAG INJ SC/IM: CPT | Mod: PN

## 2019-12-05 RX ADMIN — PEGFILGRASTIM 6 MG: 6 INJECTION SUBCUTANEOUS at 09:12

## 2019-12-10 ENCOUNTER — LAB VISIT (OUTPATIENT)
Dept: LAB | Facility: HOSPITAL | Age: 59
End: 2019-12-10
Attending: INTERNAL MEDICINE
Payer: COMMERCIAL

## 2019-12-10 DIAGNOSIS — C67.9 MALIGNANT NEOPLASM OF URINARY BLADDER, UNSPECIFIED SITE: ICD-10-CM

## 2019-12-10 LAB
ALBUMIN SERPL BCP-MCNC: 4 G/DL (ref 3.5–5.2)
ALP SERPL-CCNC: 179 U/L (ref 38–145)
ALT SERPL W/O P-5'-P-CCNC: 24 U/L (ref 10–44)
ANION GAP SERPL CALC-SCNC: 10 MMOL/L (ref 8–16)
ANISOCYTOSIS BLD QL SMEAR: SLIGHT
AST SERPL-CCNC: 19 U/L (ref 17–59)
BASOPHILS NFR BLD: 0 % (ref 0–1.9)
BILIRUB SERPL-MCNC: 0.5 MG/DL (ref 0.2–1.3)
BUN SERPL-MCNC: 17 MG/DL (ref 9–21)
CALCIUM SERPL-MCNC: 8.6 MG/DL (ref 8.4–10.2)
CHLORIDE SERPL-SCNC: 105 MMOL/L (ref 95–110)
CO2 SERPL-SCNC: 22 MMOL/L (ref 22–31)
CREAT SERPL-MCNC: 1.33 MG/DL (ref 0.5–1.4)
DIFFERENTIAL METHOD: ABNORMAL
DOHLE BOD BLD QL SMEAR: PRESENT
EOSINOPHIL NFR BLD: 0 % (ref 0–8)
ERYTHROCYTE [DISTWIDTH] IN BLOOD BY AUTOMATED COUNT: 20.4 % (ref 11.5–14.5)
EST. GFR  (AFRICAN AMERICAN): >60 ML/MIN/1.73 M^2
EST. GFR  (NON AFRICAN AMERICAN): 58 ML/MIN/1.73 M^2
GLUCOSE SERPL-MCNC: 130 MG/DL (ref 70–110)
HCT VFR BLD AUTO: 31.8 % (ref 40–54)
HGB BLD-MCNC: 10.4 G/DL (ref 14–18)
IMM GRANULOCYTES # BLD AUTO: ABNORMAL K/UL (ref 0–0.04)
IMM GRANULOCYTES NFR BLD AUTO: ABNORMAL % (ref 0–0.5)
LYMPHOCYTES NFR BLD: 4 % (ref 18–48)
MAGNESIUM SERPL-MCNC: 1.9 MG/DL (ref 1.6–2.6)
MCH RBC QN AUTO: 29.1 PG (ref 27–31)
MCHC RBC AUTO-ENTMCNC: 32.7 G/DL (ref 32–36)
MCV RBC AUTO: 89 FL (ref 82–98)
MONOCYTES NFR BLD: 9 % (ref 4–15)
NEUTROPHILS # BLD AUTO: 23.3 K/UL (ref 1.8–7.7)
NEUTROPHILS NFR BLD: 75 % (ref 38–73)
NEUTS BAND NFR BLD MANUAL: 12 %
NRBC BLD-RTO: 0 /100 WBC
PLATELET # BLD AUTO: 117 K/UL (ref 150–350)
PLATELET BLD QL SMEAR: ABNORMAL
PMV BLD AUTO: 9.6 FL (ref 9.2–12.9)
POLYCHROMASIA BLD QL SMEAR: ABNORMAL
POTASSIUM SERPL-SCNC: 4.1 MMOL/L (ref 3.5–5.1)
PROT SERPL-MCNC: 6.5 G/DL (ref 6–8.4)
RBC # BLD AUTO: 3.57 M/UL (ref 4.6–6.2)
SODIUM SERPL-SCNC: 137 MMOL/L (ref 136–145)
TOXIC GRANULES BLD QL SMEAR: PRESENT
WBC # BLD AUTO: 26.73 K/UL (ref 3.9–12.7)

## 2019-12-10 PROCEDURE — 83735 ASSAY OF MAGNESIUM: CPT

## 2019-12-10 PROCEDURE — 85027 COMPLETE CBC AUTOMATED: CPT | Mod: PN

## 2019-12-10 PROCEDURE — 80053 COMPREHEN METABOLIC PANEL: CPT

## 2019-12-10 PROCEDURE — 85007 BL SMEAR W/DIFF WBC COUNT: CPT | Mod: PN

## 2019-12-10 PROCEDURE — 36415 COLL VENOUS BLD VENIPUNCTURE: CPT | Mod: PN

## 2019-12-10 PROCEDURE — 83735 ASSAY OF MAGNESIUM: CPT | Mod: PN

## 2019-12-10 PROCEDURE — 85027 COMPLETE CBC AUTOMATED: CPT

## 2019-12-10 PROCEDURE — 85007 BL SMEAR W/DIFF WBC COUNT: CPT

## 2019-12-10 PROCEDURE — 80053 COMPREHEN METABOLIC PANEL: CPT | Mod: PN

## 2019-12-17 ENCOUNTER — LAB VISIT (OUTPATIENT)
Dept: LAB | Facility: HOSPITAL | Age: 59
End: 2019-12-17
Attending: NURSE PRACTITIONER
Payer: COMMERCIAL

## 2019-12-17 DIAGNOSIS — C67.9 MALIGNANT NEOPLASM OF URINARY BLADDER, UNSPECIFIED SITE: ICD-10-CM

## 2019-12-17 LAB
BASOPHILS # BLD AUTO: 0.06 K/UL (ref 0–0.2)
BASOPHILS NFR BLD: 0.4 % (ref 0–1.9)
DIFFERENTIAL METHOD: ABNORMAL
EOSINOPHIL # BLD AUTO: 0.2 K/UL (ref 0–0.5)
EOSINOPHIL NFR BLD: 1.1 % (ref 0–8)
ERYTHROCYTE [DISTWIDTH] IN BLOOD BY AUTOMATED COUNT: 21.7 % (ref 11.5–14.5)
HCT VFR BLD AUTO: 35.6 % (ref 40–54)
HGB BLD-MCNC: 11.3 G/DL (ref 14–18)
IMM GRANULOCYTES # BLD AUTO: 0.22 K/UL (ref 0–0.04)
IMM GRANULOCYTES NFR BLD AUTO: 1.6 % (ref 0–0.5)
LYMPHOCYTES # BLD AUTO: 1.1 K/UL (ref 1–4.8)
LYMPHOCYTES NFR BLD: 7.7 % (ref 18–48)
MCH RBC QN AUTO: 29.2 PG (ref 27–31)
MCHC RBC AUTO-ENTMCNC: 31.7 G/DL (ref 32–36)
MCV RBC AUTO: 92 FL (ref 82–98)
MONOCYTES # BLD AUTO: 1.2 K/UL (ref 0.3–1)
MONOCYTES NFR BLD: 8.6 % (ref 4–15)
NEUTROPHILS # BLD AUTO: 11.1 K/UL (ref 1.8–7.7)
NEUTROPHILS NFR BLD: 80.6 % (ref 38–73)
NRBC BLD-RTO: 0 /100 WBC
PLATELET # BLD AUTO: 246 K/UL (ref 150–350)
PMV BLD AUTO: 10.1 FL (ref 9.2–12.9)
RBC # BLD AUTO: 3.87 M/UL (ref 4.6–6.2)
WBC # BLD AUTO: 13.79 K/UL (ref 3.9–12.7)

## 2019-12-17 PROCEDURE — 36415 COLL VENOUS BLD VENIPUNCTURE: CPT | Mod: PN

## 2019-12-17 PROCEDURE — 85025 COMPLETE CBC W/AUTO DIFF WBC: CPT | Mod: PN

## 2019-12-17 PROCEDURE — 85025 COMPLETE CBC W/AUTO DIFF WBC: CPT

## 2020-01-21 ENCOUNTER — LAB VISIT (OUTPATIENT)
Dept: LAB | Facility: HOSPITAL | Age: 60
End: 2020-01-21
Attending: INTERNAL MEDICINE
Payer: COMMERCIAL

## 2020-01-21 DIAGNOSIS — C67.9 MALIGNANT NEOPLASM OF URINARY BLADDER, UNSPECIFIED SITE: ICD-10-CM

## 2020-01-21 LAB
ALBUMIN SERPL BCP-MCNC: 4.3 G/DL (ref 3.5–5.2)
ALP SERPL-CCNC: 81 U/L (ref 38–145)
ALT SERPL W/O P-5'-P-CCNC: 15 U/L (ref 0–50)
ANION GAP SERPL CALC-SCNC: 9 MMOL/L (ref 8–16)
AST SERPL-CCNC: 23 U/L (ref 17–59)
BASOPHILS # BLD AUTO: 0.04 K/UL (ref 0–0.2)
BASOPHILS NFR BLD: 0.5 % (ref 0–1.9)
BILIRUB SERPL-MCNC: 0.8 MG/DL (ref 0.2–1.3)
BUN SERPL-MCNC: 22 MG/DL (ref 9–21)
CALCIUM SERPL-MCNC: 8.7 MG/DL (ref 8.4–10.2)
CHLORIDE SERPL-SCNC: 109 MMOL/L (ref 95–110)
CO2 SERPL-SCNC: 22 MMOL/L (ref 22–31)
CREAT SERPL-MCNC: 1.68 MG/DL (ref 0.5–1.4)
DIFFERENTIAL METHOD: ABNORMAL
EOSINOPHIL # BLD AUTO: 0.2 K/UL (ref 0–0.5)
EOSINOPHIL NFR BLD: 2.2 % (ref 0–8)
ERYTHROCYTE [DISTWIDTH] IN BLOOD BY AUTOMATED COUNT: 13.3 % (ref 11.5–14.5)
EST. GFR  (AFRICAN AMERICAN): 50 ML/MIN/1.73 M^2
EST. GFR  (NON AFRICAN AMERICAN): 43 ML/MIN/1.73 M^2
GLUCOSE SERPL-MCNC: 131 MG/DL (ref 70–110)
HCT VFR BLD AUTO: 39.9 % (ref 40–54)
HGB BLD-MCNC: 13 G/DL (ref 14–18)
IMM GRANULOCYTES # BLD AUTO: 0.03 K/UL (ref 0–0.04)
IMM GRANULOCYTES NFR BLD AUTO: 0.4 % (ref 0–0.5)
LYMPHOCYTES # BLD AUTO: 1.1 K/UL (ref 1–4.8)
LYMPHOCYTES NFR BLD: 13.8 % (ref 18–48)
MCH RBC QN AUTO: 30.6 PG (ref 27–31)
MCHC RBC AUTO-ENTMCNC: 32.6 G/DL (ref 32–36)
MCV RBC AUTO: 94 FL (ref 82–98)
MONOCYTES # BLD AUTO: 0.5 K/UL (ref 0.3–1)
MONOCYTES NFR BLD: 6.4 % (ref 4–15)
NEUTROPHILS # BLD AUTO: 5.9 K/UL (ref 1.8–7.7)
NEUTROPHILS NFR BLD: 76.7 % (ref 38–73)
NRBC BLD-RTO: 0 /100 WBC
PLATELET # BLD AUTO: 203 K/UL (ref 150–350)
PMV BLD AUTO: 10.1 FL (ref 9.2–12.9)
POTASSIUM SERPL-SCNC: 4.4 MMOL/L (ref 3.5–5.1)
PROT SERPL-MCNC: 7.1 G/DL (ref 6–8.4)
RBC # BLD AUTO: 4.25 M/UL (ref 4.6–6.2)
SODIUM SERPL-SCNC: 140 MMOL/L (ref 136–145)
WBC # BLD AUTO: 7.66 K/UL (ref 3.9–12.7)

## 2020-01-21 PROCEDURE — 85025 COMPLETE CBC W/AUTO DIFF WBC: CPT

## 2020-01-21 PROCEDURE — 85025 COMPLETE CBC W/AUTO DIFF WBC: CPT | Mod: PN

## 2020-01-21 PROCEDURE — 80053 COMPREHEN METABOLIC PANEL: CPT

## 2020-01-21 PROCEDURE — 80053 COMPREHEN METABOLIC PANEL: CPT | Mod: PN

## 2020-01-21 PROCEDURE — 36415 COLL VENOUS BLD VENIPUNCTURE: CPT | Mod: PN

## 2020-01-27 ENCOUNTER — LAB VISIT (OUTPATIENT)
Dept: LAB | Facility: HOSPITAL | Age: 60
End: 2020-01-27
Attending: INTERNAL MEDICINE
Payer: COMMERCIAL

## 2020-01-27 DIAGNOSIS — C67.9 MALIGNANT NEOPLASM OF URINARY BLADDER, UNSPECIFIED SITE: ICD-10-CM

## 2020-01-27 LAB
ANION GAP SERPL CALC-SCNC: 10 MMOL/L (ref 8–16)
BUN SERPL-MCNC: 26 MG/DL (ref 9–21)
CALCIUM SERPL-MCNC: 8.8 MG/DL (ref 8.4–10.2)
CHLORIDE SERPL-SCNC: 104 MMOL/L (ref 95–110)
CO2 SERPL-SCNC: 22 MMOL/L (ref 22–31)
CREAT SERPL-MCNC: 1.36 MG/DL (ref 0.5–1.4)
EST. GFR  (AFRICAN AMERICAN): >60 ML/MIN/1.73 M^2
EST. GFR  (NON AFRICAN AMERICAN): 56 ML/MIN/1.73 M^2
GLUCOSE SERPL-MCNC: 110 MG/DL (ref 70–110)
POTASSIUM SERPL-SCNC: 5 MMOL/L (ref 3.5–5.1)
SODIUM SERPL-SCNC: 136 MMOL/L (ref 136–145)

## 2020-01-27 PROCEDURE — 80048 BASIC METABOLIC PNL TOTAL CA: CPT | Mod: PN

## 2020-01-27 PROCEDURE — 80048 BASIC METABOLIC PNL TOTAL CA: CPT

## 2020-01-27 PROCEDURE — 36415 COLL VENOUS BLD VENIPUNCTURE: CPT | Mod: PN

## 2020-02-04 ENCOUNTER — LAB VISIT (OUTPATIENT)
Dept: LAB | Facility: HOSPITAL | Age: 60
End: 2020-02-04
Attending: INTERNAL MEDICINE
Payer: COMMERCIAL

## 2020-02-04 DIAGNOSIS — C67.9 MALIGNANT NEOPLASM OF URINARY BLADDER, UNSPECIFIED SITE: ICD-10-CM

## 2020-02-04 LAB
ANION GAP SERPL CALC-SCNC: 10 MMOL/L (ref 8–16)
BUN SERPL-MCNC: 21 MG/DL (ref 9–21)
CALCIUM SERPL-MCNC: 8.6 MG/DL (ref 8.4–10.2)
CHLORIDE SERPL-SCNC: 107 MMOL/L (ref 95–110)
CO2 SERPL-SCNC: 21 MMOL/L (ref 22–31)
CREAT SERPL-MCNC: 1.45 MG/DL (ref 0.5–1.4)
EST. GFR  (AFRICAN AMERICAN): >60 ML/MIN/1.73 M^2
EST. GFR  (NON AFRICAN AMERICAN): 52 ML/MIN/1.73 M^2
GLUCOSE SERPL-MCNC: 96 MG/DL (ref 70–110)
POTASSIUM SERPL-SCNC: 4.5 MMOL/L (ref 3.5–5.1)
SODIUM SERPL-SCNC: 138 MMOL/L (ref 136–145)

## 2020-02-04 PROCEDURE — 36415 COLL VENOUS BLD VENIPUNCTURE: CPT | Mod: PN

## 2020-02-04 PROCEDURE — 80048 BASIC METABOLIC PNL TOTAL CA: CPT

## 2020-02-04 PROCEDURE — 80048 BASIC METABOLIC PNL TOTAL CA: CPT | Mod: PN

## 2020-02-18 ENCOUNTER — LAB VISIT (OUTPATIENT)
Dept: LAB | Facility: HOSPITAL | Age: 60
End: 2020-02-18
Attending: INTERNAL MEDICINE
Payer: COMMERCIAL

## 2020-02-18 DIAGNOSIS — C67.9 MALIGNANT NEOPLASM OF URINARY BLADDER, UNSPECIFIED SITE: ICD-10-CM

## 2020-02-18 LAB
ALBUMIN SERPL BCP-MCNC: 4.5 G/DL (ref 3.5–5.2)
ALP SERPL-CCNC: 80 U/L (ref 38–145)
ALT SERPL W/O P-5'-P-CCNC: 16 U/L (ref 0–50)
ANION GAP SERPL CALC-SCNC: 10 MMOL/L (ref 8–16)
AST SERPL-CCNC: 27 U/L (ref 17–59)
BASOPHILS # BLD AUTO: 0.03 K/UL (ref 0–0.2)
BASOPHILS NFR BLD: 0.5 % (ref 0–1.9)
BILIRUB SERPL-MCNC: 0.7 MG/DL (ref 0.2–1.3)
BUN SERPL-MCNC: 20 MG/DL (ref 9–21)
CALCIUM SERPL-MCNC: 8.6 MG/DL (ref 8.4–10.2)
CHLORIDE SERPL-SCNC: 106 MMOL/L (ref 95–110)
CO2 SERPL-SCNC: 20 MMOL/L (ref 22–31)
CREAT SERPL-MCNC: 1.41 MG/DL (ref 0.5–1.4)
DIFFERENTIAL METHOD: ABNORMAL
EOSINOPHIL # BLD AUTO: 0.2 K/UL (ref 0–0.5)
EOSINOPHIL NFR BLD: 2.3 % (ref 0–8)
ERYTHROCYTE [DISTWIDTH] IN BLOOD BY AUTOMATED COUNT: 12 % (ref 11.5–14.5)
EST. GFR  (AFRICAN AMERICAN): >60 ML/MIN/1.73 M^2
EST. GFR  (NON AFRICAN AMERICAN): 54 ML/MIN/1.73 M^2
GLUCOSE SERPL-MCNC: 124 MG/DL (ref 70–110)
HCT VFR BLD AUTO: 44.4 % (ref 40–54)
HGB BLD-MCNC: 14.1 G/DL (ref 14–18)
IMM GRANULOCYTES # BLD AUTO: 0.02 K/UL (ref 0–0.04)
IMM GRANULOCYTES NFR BLD AUTO: 0.3 % (ref 0–0.5)
LYMPHOCYTES # BLD AUTO: 1.2 K/UL (ref 1–4.8)
LYMPHOCYTES NFR BLD: 19.1 % (ref 18–48)
MCH RBC QN AUTO: 28.7 PG (ref 27–31)
MCHC RBC AUTO-ENTMCNC: 31.8 G/DL (ref 32–36)
MCV RBC AUTO: 90 FL (ref 82–98)
MONOCYTES # BLD AUTO: 0.4 K/UL (ref 0.3–1)
MONOCYTES NFR BLD: 5.4 % (ref 4–15)
NEUTROPHILS # BLD AUTO: 4.7 K/UL (ref 1.8–7.7)
NEUTROPHILS NFR BLD: 72.4 % (ref 38–73)
NRBC BLD-RTO: 0 /100 WBC
PLATELET # BLD AUTO: 194 K/UL (ref 150–350)
PMV BLD AUTO: 10.3 FL (ref 9.2–12.9)
POTASSIUM SERPL-SCNC: 4.6 MMOL/L (ref 3.5–5.1)
PROT SERPL-MCNC: 7.5 G/DL (ref 6–8.4)
RBC # BLD AUTO: 4.91 M/UL (ref 4.6–6.2)
SODIUM SERPL-SCNC: 136 MMOL/L (ref 136–145)
WBC # BLD AUTO: 6.43 K/UL (ref 3.9–12.7)

## 2020-02-18 PROCEDURE — 80053 COMPREHEN METABOLIC PANEL: CPT | Mod: PN

## 2020-02-18 PROCEDURE — 36415 COLL VENOUS BLD VENIPUNCTURE: CPT | Mod: PN

## 2020-02-18 PROCEDURE — 85025 COMPLETE CBC W/AUTO DIFF WBC: CPT

## 2020-02-18 PROCEDURE — 80053 COMPREHEN METABOLIC PANEL: CPT

## 2020-02-18 PROCEDURE — 85025 COMPLETE CBC W/AUTO DIFF WBC: CPT | Mod: PN

## 2020-06-23 ENCOUNTER — LAB VISIT (OUTPATIENT)
Dept: LAB | Facility: HOSPITAL | Age: 60
End: 2020-06-23
Attending: PHYSICIAN ASSISTANT
Payer: COMMERCIAL

## 2020-06-23 DIAGNOSIS — C67.9 MALIGNANT NEOPLASM OF URINARY BLADDER, UNSPECIFIED SITE: ICD-10-CM

## 2020-06-23 LAB
ALBUMIN SERPL BCP-MCNC: 4.7 G/DL (ref 3.5–5.2)
ALP SERPL-CCNC: 67 U/L (ref 38–145)
ALT SERPL W/O P-5'-P-CCNC: 25 U/L (ref 0–50)
ANION GAP SERPL CALC-SCNC: 60 MMOL/L (ref 8–16)
AST SERPL-CCNC: 44 U/L (ref 17–59)
BASOPHILS # BLD AUTO: 0.04 K/UL (ref 0–0.2)
BASOPHILS NFR BLD: 0.5 % (ref 0–1.9)
BILIRUB SERPL-MCNC: 1.6 MG/DL (ref 0.2–1.3)
BUN SERPL-MCNC: 21 MG/DL (ref 9–21)
CALCIUM SERPL-MCNC: 8.8 MG/DL (ref 8.4–10.2)
CHLORIDE SERPL-SCNC: 109 MMOL/L (ref 95–110)
CO2 SERPL-SCNC: 20 MMOL/L (ref 22–31)
CREAT SERPL-MCNC: 1.35 MG/DL (ref 0.5–1.4)
DIFFERENTIAL METHOD: ABNORMAL
EOSINOPHIL # BLD AUTO: 0.2 K/UL (ref 0–0.5)
EOSINOPHIL NFR BLD: 2.5 % (ref 0–8)
ERYTHROCYTE [DISTWIDTH] IN BLOOD BY AUTOMATED COUNT: 13.6 % (ref 11.5–14.5)
EST. GFR  (AFRICAN AMERICAN): >60 ML/MIN/1.73 M^2
EST. GFR  (NON AFRICAN AMERICAN): 57 ML/MIN/1.73 M^2
GLUCOSE SERPL-MCNC: 98 MG/DL (ref 70–110)
HCT VFR BLD AUTO: 45 % (ref 40–54)
HGB BLD-MCNC: 14.7 G/DL (ref 14–18)
IMM GRANULOCYTES # BLD AUTO: 0.03 K/UL (ref 0–0.04)
IMM GRANULOCYTES NFR BLD AUTO: 0.4 % (ref 0–0.5)
LYMPHOCYTES # BLD AUTO: 1.2 K/UL (ref 1–4.8)
LYMPHOCYTES NFR BLD: 14.7 % (ref 18–48)
MCH RBC QN AUTO: 29.9 PG (ref 27–31)
MCHC RBC AUTO-ENTMCNC: 32.7 G/DL (ref 32–36)
MCV RBC AUTO: 92 FL (ref 82–98)
MONOCYTES # BLD AUTO: 0.8 K/UL (ref 0.3–1)
MONOCYTES NFR BLD: 10.2 % (ref 4–15)
NEUTROPHILS # BLD AUTO: 5.6 K/UL (ref 1.8–7.7)
NEUTROPHILS NFR BLD: 71.7 % (ref 38–73)
NRBC BLD-RTO: 0 /100 WBC
PLATELET # BLD AUTO: 171 K/UL (ref 150–350)
PMV BLD AUTO: 10.6 FL (ref 9.2–12.9)
POTASSIUM SERPL-SCNC: 4.3 MMOL/L (ref 3.5–5.1)
PROT SERPL-MCNC: 7.7 G/DL (ref 6–8.4)
RBC # BLD AUTO: 4.92 M/UL (ref 4.6–6.2)
SODIUM SERPL-SCNC: 189 MMOL/L (ref 136–145)
WBC # BLD AUTO: 7.87 K/UL (ref 3.9–12.7)

## 2020-06-23 PROCEDURE — 85025 COMPLETE CBC W/AUTO DIFF WBC: CPT

## 2020-06-23 PROCEDURE — 85025 COMPLETE CBC W/AUTO DIFF WBC: CPT | Mod: PN

## 2020-06-23 PROCEDURE — 36415 COLL VENOUS BLD VENIPUNCTURE: CPT | Mod: PN

## 2020-06-23 PROCEDURE — 80053 COMPREHEN METABOLIC PANEL: CPT | Mod: PN

## 2020-06-23 PROCEDURE — 80053 COMPREHEN METABOLIC PANEL: CPT

## 2021-03-05 ENCOUNTER — IMMUNIZATION (OUTPATIENT)
Dept: FAMILY MEDICINE | Facility: CLINIC | Age: 61
End: 2021-03-05
Payer: COMMERCIAL

## 2021-03-05 DIAGNOSIS — Z23 NEED FOR VACCINATION: Primary | ICD-10-CM

## 2021-03-05 PROCEDURE — 0031A COVID-19,VECTOR-NR,RS-AD26,PF,0.5 ML DOSE VACCINE (JANSSEN): CPT | Mod: PBBFAC | Performed by: FAMILY MEDICINE

## 2022-01-06 ENCOUNTER — IMMUNIZATION (OUTPATIENT)
Dept: FAMILY MEDICINE | Facility: CLINIC | Age: 62
End: 2022-01-06
Payer: COMMERCIAL

## 2022-01-06 DIAGNOSIS — Z23 NEED FOR VACCINATION: Primary | ICD-10-CM

## 2022-01-06 PROCEDURE — 0034A COVID-19,VECTOR-NR,RS-AD26,PF,0.5 ML DOSE VACCINE (JANSSEN): CPT | Mod: PBBFAC | Performed by: FAMILY MEDICINE

## 2022-06-08 PROBLEM — Z90.5 H/O LEFT NEPHRECTOMY: Status: ACTIVE | Noted: 2022-06-08

## 2022-06-08 PROBLEM — Z90.79 H/O PROSTATECTOMY: Status: ACTIVE | Noted: 2022-06-08

## 2023-02-22 PROBLEM — N18.32 STAGE 3B CHRONIC KIDNEY DISEASE: Status: ACTIVE | Noted: 2023-02-22

## 2023-02-22 PROBLEM — E66.01 SEVERE OBESITY (BMI 35.0-39.9) WITH COMORBIDITY: Status: ACTIVE | Noted: 2023-02-22

## 2023-03-22 PROBLEM — Z71.89 ADVANCE CARE PLANNING: Status: ACTIVE | Noted: 2023-03-22

## 2023-03-22 PROBLEM — N39.0 UTI (URINARY TRACT INFECTION): Status: ACTIVE | Noted: 2023-03-22

## 2023-03-22 PROBLEM — N17.9 AKI (ACUTE KIDNEY INJURY): Status: ACTIVE | Noted: 2023-03-22

## 2023-03-23 PROBLEM — N39.0 COMPLICATED UTI (URINARY TRACT INFECTION): Status: ACTIVE | Noted: 2023-03-23

## 2023-03-25 PROBLEM — R19.00 PELVIC MASS: Status: ACTIVE | Noted: 2023-03-25

## 2023-04-06 ENCOUNTER — OFFICE VISIT (OUTPATIENT)
Dept: INFECTIOUS DISEASES | Facility: CLINIC | Age: 63
End: 2023-04-06
Payer: COMMERCIAL

## 2023-04-06 ENCOUNTER — TELEPHONE (OUTPATIENT)
Dept: INFECTIOUS DISEASES | Facility: CLINIC | Age: 63
End: 2023-04-06
Payer: COMMERCIAL

## 2023-04-06 VITALS
TEMPERATURE: 99 F | WEIGHT: 227.75 LBS | HEART RATE: 75 BPM | SYSTOLIC BLOOD PRESSURE: 158 MMHG | HEIGHT: 68 IN | DIASTOLIC BLOOD PRESSURE: 89 MMHG | BODY MASS INDEX: 34.52 KG/M2

## 2023-04-06 DIAGNOSIS — Z90.79 H/O PROSTATECTOMY: ICD-10-CM

## 2023-04-06 DIAGNOSIS — C67.9 MALIGNANT NEOPLASM OF URINARY BLADDER, UNSPECIFIED SITE: ICD-10-CM

## 2023-04-06 DIAGNOSIS — Z90.5 H/O LEFT NEPHRECTOMY: ICD-10-CM

## 2023-04-06 DIAGNOSIS — N39.0 COMPLICATED UTI (URINARY TRACT INFECTION): Primary | ICD-10-CM

## 2023-04-06 PROCEDURE — 3077F PR MOST RECENT SYSTOLIC BLOOD PRESSURE >= 140 MM HG: ICD-10-PCS | Mod: CPTII,S$GLB,, | Performed by: PHYSICIAN ASSISTANT

## 2023-04-06 PROCEDURE — 99214 OFFICE O/P EST MOD 30 MIN: CPT | Mod: S$GLB,,, | Performed by: PHYSICIAN ASSISTANT

## 2023-04-06 PROCEDURE — 3008F PR BODY MASS INDEX (BMI) DOCUMENTED: ICD-10-PCS | Mod: CPTII,S$GLB,, | Performed by: PHYSICIAN ASSISTANT

## 2023-04-06 PROCEDURE — 1111F PR DISCHARGE MEDS RECONCILED W/ CURRENT OUTPATIENT MED LIST: ICD-10-PCS | Mod: CPTII,S$GLB,, | Performed by: PHYSICIAN ASSISTANT

## 2023-04-06 PROCEDURE — 1111F DSCHRG MED/CURRENT MED MERGE: CPT | Mod: CPTII,S$GLB,, | Performed by: PHYSICIAN ASSISTANT

## 2023-04-06 PROCEDURE — 3079F DIAST BP 80-89 MM HG: CPT | Mod: CPTII,S$GLB,, | Performed by: PHYSICIAN ASSISTANT

## 2023-04-06 PROCEDURE — 1160F RVW MEDS BY RX/DR IN RCRD: CPT | Mod: CPTII,S$GLB,, | Performed by: PHYSICIAN ASSISTANT

## 2023-04-06 PROCEDURE — 1159F PR MEDICATION LIST DOCUMENTED IN MEDICAL RECORD: ICD-10-PCS | Mod: CPTII,S$GLB,, | Performed by: PHYSICIAN ASSISTANT

## 2023-04-06 PROCEDURE — 4010F ACE/ARB THERAPY RXD/TAKEN: CPT | Mod: CPTII,S$GLB,, | Performed by: PHYSICIAN ASSISTANT

## 2023-04-06 PROCEDURE — 1159F MED LIST DOCD IN RCRD: CPT | Mod: CPTII,S$GLB,, | Performed by: PHYSICIAN ASSISTANT

## 2023-04-06 PROCEDURE — 99999 PR PBB SHADOW E&M-EST. PATIENT-LVL III: CPT | Mod: PBBFAC,,, | Performed by: PHYSICIAN ASSISTANT

## 2023-04-06 PROCEDURE — 99214 PR OFFICE/OUTPT VISIT, EST, LEVL IV, 30-39 MIN: ICD-10-PCS | Mod: S$GLB,,, | Performed by: PHYSICIAN ASSISTANT

## 2023-04-06 PROCEDURE — 3079F PR MOST RECENT DIASTOLIC BLOOD PRESSURE 80-89 MM HG: ICD-10-PCS | Mod: CPTII,S$GLB,, | Performed by: PHYSICIAN ASSISTANT

## 2023-04-06 PROCEDURE — 3077F SYST BP >= 140 MM HG: CPT | Mod: CPTII,S$GLB,, | Performed by: PHYSICIAN ASSISTANT

## 2023-04-06 PROCEDURE — 1160F PR REVIEW ALL MEDS BY PRESCRIBER/CLIN PHARMACIST DOCUMENTED: ICD-10-PCS | Mod: CPTII,S$GLB,, | Performed by: PHYSICIAN ASSISTANT

## 2023-04-06 PROCEDURE — 4010F PR ACE/ARB THEARPY RXD/TAKEN: ICD-10-PCS | Mod: CPTII,S$GLB,, | Performed by: PHYSICIAN ASSISTANT

## 2023-04-06 PROCEDURE — 99999 PR PBB SHADOW E&M-EST. PATIENT-LVL III: ICD-10-PCS | Mod: PBBFAC,,, | Performed by: PHYSICIAN ASSISTANT

## 2023-04-06 PROCEDURE — 3008F BODY MASS INDEX DOCD: CPT | Mod: CPTII,S$GLB,, | Performed by: PHYSICIAN ASSISTANT

## 2023-04-06 NOTE — TELEPHONE ENCOUNTER
Patient in today for follow up. Per MIKE Caceres - called [facility], extended IV Zosyn x 1 week, until seen by Dr. Russell. (Through Next Tuesday), spoke with Elana at [facility].

## 2023-04-06 NOTE — PROGRESS NOTES
[facility]  Infectious Disease        Patient ID: Ab Lomas is a 62 y.o. male.    Chief Complaint: Follow-up (Recurrent UTI )      Ab was seen today for follow-up.    Diagnoses and all orders for this visit:    Complicated UTI (urinary tract infection)  -     CT Abdomen Pelvis  Without Contrast; Future    Malignant neoplasm of urinary bladder, unspecified site    H/O left nephrectomy    H/O prostatectomy         Greater than 30 minutes was spent on this encounter, which included: review of recent encounters, review and interpretation of labs/images, obtaining pertinent history, performing a physical examination, counseling and educating the patient/family/caregiver, ordering medications/tests, documenting in the electronic health record, and coordinating care with necessary providers.    Interval HPI:   3/23 Patient tells me that he is feeling better since admission. Feels well. Tolerating IV zosyn.  3/24: Patient tells me he is feeling better.  Receiving IV Zosyn with no side effects.  CT scan from outside hospital received with no acute abnormalities  3/27: Patient evaluated at bedside.  Patient tells me he is feeling well.  Afebrile.  Receiving IV Zosyn with no side effects.  CT scan abnormal with evidence  of pelvic mass. Kidney stone as well.  3/28: Feeling back to normal, ready to go home. IR aspiration yesterday with evidence of seroma. 60cc aspirated with post-procedure scan showing lesion completely gone. Cultures pending. PICC placed today.  4/6: ID clinic f/u. Patient here with his wife. States he has been doing well overall, but feeling weaker than baseline since hospitalization and with IV abx. Got lightheaded recently when working in the yard. Denied fevers, chills, night sweats, abdominal pain, hematuria, or dysuria. Compliant with IV Zosyn and tolerating TID dosing. Has appt with Urology Dr. Russell on 4/11.     Assessment and Plan:      # Complicated urinary tract  "infection.   - 1/26: Urine culture:  Pseudomonas, Enterococcus   - 2/23: Urine culture:  Pseudomonas, Enterococcus   - 3/20 urine culture:  Pseudomonas, Enterococcus  - 3/22: Bcx:  Negative   - CT abdomen/pelvis:  Noted pelvic mass.  Nephrolithiasis in distal right ureter   - 3/27 s/p IR biopsy of "mass" which ended up being a seroma. 60cc of fluid aspirated with post-procedure scan showing lesion completely resolved.  - Cx: NGTD     # History of bladder cancer, s/p roldan-bladder creation     Recs:   - Labs reviewed and WNL. Cultures remain negative from IR aspirate  - prior to discharge, repeat imaging was discussed with Urology. However not scheduled yet. Will order CT A/P today to ensure no re-accumulation of fluid collection or residual stones  - Will cont on IV Zosyn until then. Unfortunately no oral options for step-down  - if imaging is unrevealing and no plans for further intervention, will plan to d/c IV abx  - continue weekly CBC, CMP while abx remain  - will f/u with patient after imaging and f/u with Urology  - advised caution with his level of physical activity until he regains his baseline strength s/p hospitalization and IV abx     Kindra Mann PA-C  Infectious Diseases  [facility]         HPI:      This is a 63 y/o male with multiple medical issues. CKD, bladder cancer w/ neobladder and nephrectomy.   - Patient tells me that he has been having issues with urinary infections since August 2022 after noticing hematuria and passing a stone/clip.   - Patient has had Urine cultures which have shown persistent Pseudomonas and Enterococcus for which patient has received several courses of abxs, last reported to be Cipro and Amoxicillin.   - He tells me that he feels better temporarily and then feels poorly shortly after finishing a course of abxs.   - Due to recurrences, and feeling poorly repeat urine was requested noticing recurrent pseudomonas and enterococcus, reason for which " patient was instructed to come back to the hospital for further evaluation.       Past Medical History:   Diagnosis Date    Bladder cancer     History of hypertension        Past Surgical History:   Procedure Laterality Date    BLADDER SURGERY      removal with new one made    chemo treatment      done 2019    NEPHRECTOMY Left 2021    PROSTATE SURGERY  2019    pat reported       Family History   Problem Relation Age of Onset    Cancer Mother         possible lung    Hypertension Mother     Cancer Maternal Uncle         lung    No Known Problems Sister     No Known Problems Brother     No Known Problems Maternal Grandmother     Heart disease Maternal Grandfather     No Known Problems Paternal Grandfather        Social History     Socioeconomic History    Marital status:      Spouse name: Janae    Number of children: 2   Tobacco Use    Smoking status: Former     Types: Cigarettes     Quit date: 2014     Years since quittin.0    Smokeless tobacco: Current     Types: Chew    Tobacco comments:     still chew tobacco rarely   Substance and Sexual Activity    Alcohol use: Yes     Alcohol/week: 3.0 - 6.0 standard drinks     Types: 3 - 6 Cans of beer per week     Comment: weekends    Drug use: Never    Sexual activity: Yes     Partners: Female     Birth control/protection: None       Review of patient's allergies indicates:  No Known Allergies    Current Outpatient Medications   Medication Instructions    fluticasone propionate (FLONASE) 50 mcg/actuation nasal spray 1 spray, Each Nostril, Daily PRN    olmesartan (BENICAR) 20 mg, Oral, Nightly    piperacillin sodium/tazobactam (PIPERACILLIN-TAZOBACTAM 4.5G/100ML D5W IVPB, READY TO MIX,) 4.5 g, Intravenous, Every 8 hours         Review of Systems   Constitutional:  Positive for activity change. Negative for appetite change, chills, fatigue and fever.   HENT:  Negative for congestion, mouth sores, sinus pain and sore throat.    Eyes:  Negative for  photophobia and visual disturbance.   Respiratory:  Negative for cough, chest tightness, shortness of breath and wheezing.    Cardiovascular:  Negative for chest pain, palpitations and leg swelling.   Gastrointestinal:  Negative for abdominal pain, diarrhea, nausea and vomiting.   Genitourinary:  Negative for difficulty urinating, dysuria, flank pain, frequency, hematuria and urgency.   Musculoskeletal:  Negative for arthralgias, myalgias, neck pain and neck stiffness.   Skin:  Negative for color change and rash.   Neurological:  Negative for dizziness, seizures and headaches.   Psychiatric/Behavioral:  Negative for confusion.          Objective:     Vitals:    04/06/23 0922   BP: (!) 158/89   Pulse: 75   Temp: 98.6 °F (37 °C)              Physical Exam  Vitals and nursing note reviewed.   Constitutional:       General: He is awake. He is not in acute distress.     Appearance: He is well-developed and well-groomed. He is not diaphoretic.   HENT:      Head: Normocephalic and atraumatic.      Right Ear: External ear normal.      Left Ear: External ear normal.      Nose: Nose normal.   Eyes:      General: No scleral icterus.        Right eye: No discharge.         Left eye: No discharge.      Pupils: Pupils are equal, round, and reactive to light.   Cardiovascular:      Rate and Rhythm: Normal rate and regular rhythm.      Heart sounds: Normal heart sounds. No murmur heard.  Pulmonary:      Effort: Pulmonary effort is normal. No accessory muscle usage or respiratory distress.      Breath sounds: Normal breath sounds.   Abdominal:      General: Bowel sounds are normal. There is no distension.      Palpations: Abdomen is soft.      Tenderness: There is no abdominal tenderness. There is no guarding.   Musculoskeletal:      Cervical back: Normal range of motion and neck supple.   Lymphadenopathy:      Cervical: No cervical adenopathy.   Skin:     General: Skin is warm and dry.      Comments: PICC line in upper extremity    Neurological:      General: No focal deficit present.      Mental Status: He is alert and oriented to person, place, and time.   Psychiatric:         Mood and Affect: Mood normal.         Behavior: Behavior normal.         Estimated Creatinine Clearance: 38.8 mL/min (A) (based on SCr of 2.27 mg/dL (H)).      Microbiology Results (last 7 days)       ** No results found for the last 168 hours. **              Significant Labs: All pertinent labs within the past 24 hours have been reviewed.     Significant Imaging: I have reviewed all relevant and available imaging results/findings within the past 24 hours.      Plan -- see top of note

## 2023-04-10 ENCOUNTER — TELEPHONE (OUTPATIENT)
Dept: INFECTIOUS DISEASES | Facility: CLINIC | Age: 63
End: 2023-04-10
Payer: COMMERCIAL

## 2023-04-10 NOTE — TELEPHONE ENCOUNTER
----- Message from Kindra Mann PA-C sent at 4/10/2023 10:12 AM CDT -----  Regarding: FW: advise  Contact: patient  Okay to call [facility] and extend IV abx to 4/13 until patient's CT can be done.    ----- Message -----  From: Charlotte Bautista  Sent: 4/10/2023   9:35 AM CDT  To: Johnathan Arguelles Staff  Subject: advise                                           Type: Needs Medical Advice  Who Called:  patient  Symptoms (please be specific):  had to change his ct apt to Thursday  How long has patient had these symptoms:    Pharmacy name and phone #:    Best Call Back Number: [phone]    Additional Information: He states he will be out of his IV medication by then please call to advise. Thanks!

## 2023-04-10 NOTE — TELEPHONE ENCOUNTER
Per MIKE Caceres - called [facility], spoke with Elana, advised to extend iv abx to 4/13 until patient's CT can be done.     Called patient, informed of above, also.

## 2023-04-13 ENCOUNTER — TELEPHONE (OUTPATIENT)
Dept: INFECTIOUS DISEASES | Facility: CLINIC | Age: 63
End: 2023-04-13
Payer: COMMERCIAL

## 2023-04-13 ENCOUNTER — HOSPITAL ENCOUNTER (OUTPATIENT)
Dept: RADIOLOGY | Facility: HOSPITAL | Age: 63
Discharge: HOME | End: 2023-04-13
Attending: PHYSICIAN ASSISTANT
Payer: COMMERCIAL

## 2023-04-13 DIAGNOSIS — N39.0 COMPLICATED UTI (URINARY TRACT INFECTION): ICD-10-CM

## 2023-04-13 PROCEDURE — 74176 CT ABD & PELVIS W/O CONTRAST: CPT | Mod: TC

## 2023-04-13 NOTE — TELEPHONE ENCOUNTER
Patient called, had CT this morning, sees urology this afternoon at 4 pm He has one dose left of IV abx, wants to know if he will continue abx or not?      Please advise.

## 2023-04-14 NOTE — TELEPHONE ENCOUNTER
CT results reviewed with small stone in right kidney and likely small stones in the bladder. Hydronephrosis is resolved. Stable collection in the left pelvis which was aspirated and found to be seroma.    Discussed with Dr. Russell, no further action at this time. Patient will f/u with him in 3 months.    No oral options for antibiotic suppression of Pseudomonas. If no further procedures, will d/c antibiotics.    Message sent to [facility] for PICC line removal and d/c Zosyn. Patient can f/u PRN.

## 2023-05-08 PROBLEM — N99.89: Status: ACTIVE | Noted: 2023-05-08

## 2023-05-09 PROBLEM — N12 PYELONEPHRITIS: Status: ACTIVE | Noted: 2023-05-09

## 2023-05-09 PROBLEM — R78.81 BACTEREMIA: Status: ACTIVE | Noted: 2023-05-09

## 2023-06-06 ENCOUNTER — OFFICE VISIT (OUTPATIENT)
Dept: INFECTIOUS DISEASES | Facility: CLINIC | Age: 63
End: 2023-06-06
Payer: COMMERCIAL

## 2023-06-06 VITALS
SYSTOLIC BLOOD PRESSURE: 148 MMHG | TEMPERATURE: 98 F | WEIGHT: 222.69 LBS | DIASTOLIC BLOOD PRESSURE: 85 MMHG | RESPIRATION RATE: 20 BRPM | HEART RATE: 71 BPM | HEIGHT: 68 IN | BODY MASS INDEX: 33.75 KG/M2

## 2023-06-06 DIAGNOSIS — N12 PYELONEPHRITIS: ICD-10-CM

## 2023-06-06 DIAGNOSIS — C67.9 MALIGNANT NEOPLASM OF URINARY BLADDER, UNSPECIFIED SITE: ICD-10-CM

## 2023-06-06 DIAGNOSIS — N39.0 COMPLICATED UTI (URINARY TRACT INFECTION): ICD-10-CM

## 2023-06-06 DIAGNOSIS — R78.81 BACTEREMIA: Primary | ICD-10-CM

## 2023-06-06 DIAGNOSIS — N18.32 STAGE 3B CHRONIC KIDNEY DISEASE: ICD-10-CM

## 2023-06-06 DIAGNOSIS — N20.0 NEPHROLITHIASIS: ICD-10-CM

## 2023-06-06 PROCEDURE — 1159F PR MEDICATION LIST DOCUMENTED IN MEDICAL RECORD: ICD-10-PCS | Mod: CPTII,S$GLB,, | Performed by: PHYSICIAN ASSISTANT

## 2023-06-06 PROCEDURE — 3077F PR MOST RECENT SYSTOLIC BLOOD PRESSURE >= 140 MM HG: ICD-10-PCS | Mod: CPTII,S$GLB,, | Performed by: PHYSICIAN ASSISTANT

## 2023-06-06 PROCEDURE — 1159F MED LIST DOCD IN RCRD: CPT | Mod: CPTII,S$GLB,, | Performed by: PHYSICIAN ASSISTANT

## 2023-06-06 PROCEDURE — 4010F ACE/ARB THERAPY RXD/TAKEN: CPT | Mod: CPTII,S$GLB,, | Performed by: PHYSICIAN ASSISTANT

## 2023-06-06 PROCEDURE — 3008F BODY MASS INDEX DOCD: CPT | Mod: CPTII,S$GLB,, | Performed by: PHYSICIAN ASSISTANT

## 2023-06-06 PROCEDURE — 4010F PR ACE/ARB THEARPY RXD/TAKEN: ICD-10-PCS | Mod: CPTII,S$GLB,, | Performed by: PHYSICIAN ASSISTANT

## 2023-06-06 PROCEDURE — 1111F PR DISCHARGE MEDS RECONCILED W/ CURRENT OUTPATIENT MED LIST: ICD-10-PCS | Mod: CPTII,S$GLB,, | Performed by: PHYSICIAN ASSISTANT

## 2023-06-06 PROCEDURE — 99214 PR OFFICE/OUTPT VISIT, EST, LEVL IV, 30-39 MIN: ICD-10-PCS | Mod: S$GLB,,, | Performed by: PHYSICIAN ASSISTANT

## 2023-06-06 PROCEDURE — 99999 PR PBB SHADOW E&M-EST. PATIENT-LVL III: ICD-10-PCS | Mod: PBBFAC,,, | Performed by: PHYSICIAN ASSISTANT

## 2023-06-06 PROCEDURE — 99999 PR PBB SHADOW E&M-EST. PATIENT-LVL III: CPT | Mod: PBBFAC,,, | Performed by: PHYSICIAN ASSISTANT

## 2023-06-06 PROCEDURE — 3079F PR MOST RECENT DIASTOLIC BLOOD PRESSURE 80-89 MM HG: ICD-10-PCS | Mod: CPTII,S$GLB,, | Performed by: PHYSICIAN ASSISTANT

## 2023-06-06 PROCEDURE — 1160F PR REVIEW ALL MEDS BY PRESCRIBER/CLIN PHARMACIST DOCUMENTED: ICD-10-PCS | Mod: CPTII,S$GLB,, | Performed by: PHYSICIAN ASSISTANT

## 2023-06-06 PROCEDURE — 1160F RVW MEDS BY RX/DR IN RCRD: CPT | Mod: CPTII,S$GLB,, | Performed by: PHYSICIAN ASSISTANT

## 2023-06-06 PROCEDURE — 3008F PR BODY MASS INDEX (BMI) DOCUMENTED: ICD-10-PCS | Mod: CPTII,S$GLB,, | Performed by: PHYSICIAN ASSISTANT

## 2023-06-06 PROCEDURE — 3079F DIAST BP 80-89 MM HG: CPT | Mod: CPTII,S$GLB,, | Performed by: PHYSICIAN ASSISTANT

## 2023-06-06 PROCEDURE — 99214 OFFICE O/P EST MOD 30 MIN: CPT | Mod: S$GLB,,, | Performed by: PHYSICIAN ASSISTANT

## 2023-06-06 PROCEDURE — 1111F DSCHRG MED/CURRENT MED MERGE: CPT | Mod: CPTII,S$GLB,, | Performed by: PHYSICIAN ASSISTANT

## 2023-06-06 PROCEDURE — 3077F SYST BP >= 140 MM HG: CPT | Mod: CPTII,S$GLB,, | Performed by: PHYSICIAN ASSISTANT

## 2023-06-06 NOTE — PROGRESS NOTES
[facility]  Infectious Disease        Patient ID: Ab Lomas is a 62 y.o. male.    Chief Complaint: Follow-up, Urinary Tract Infection, and Blood Infection      Ab was seen today for follow-up, urinary tract infection and blood infection.    Diagnoses and all orders for this visit:    Bacteremia    Complicated UTI (urinary tract infection)    Pyelonephritis    Malignant neoplasm of urinary bladder, unspecified site    Stage 3b chronic kidney disease    Nephrolithiasis         Greater than 30 minutes was spent on this encounter, which included: review of recent encounters, review and interpretation of labs/images, obtaining pertinent history, performing a physical examination, counseling and educating the patient/family/caregiver, ordering medications/tests, documenting in the electronic health record, and coordinating care with necessary providers.    Interval HPI:   6/6 - patient seen in ID clinic with wife and grandsons. Reports he is feeling well. Denies fevers, chills, night sweats. This is the first time since August 2022 that his hematuria has completely resolved. Tolerating the IV antibiotics without issues.     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  - Discharged home on Meropenem 1g IV q12h (renal dose) + Daptomycin 6mg/kg IV q24h for 4 weeks (EOC 6/6/23)     # Complicated UTI, recurrent  - see HPI for details  - recurrent Pseudomonas and E. Faecalis infections  - recently completed 3+ weeks IV Zosyn on 4/14/23  - 5/8 CT renal showed Mild hydronephrosis and hydroureter with thickening of the collecting system as well as renal pelvis, Questionable stricture at the anastomosis with the ileal conduit, and Similar  appearing fluid collection along the left hemipelvis measuring 6.4 x 4.1 cm  - 5/8 Urine cultures with Pseudomonas and E. Faecalis again     # BERTHA on CKD  - resolved     # Solitary kidney s/p left nephrectomy     # History of bladder cancer, s/p roldan-bladder creation     Recommendations:  - he has completed 4 weeks of IV Dapto/Meropenem for recurrent complicated UTI and bacteremia  - d/c IV Dapto and Meropenem today  - PICC line pulled today from right antecubital after sterile site prepped per protocol. 45cm intact catheter removed without complication. PICC catheter tip visualized and intact. Pressure dressing applied. Patient tolerated well.  - has f/u with Urology and possible ultrasound in the next 2 weeks to eval if there are large stones. He understands he is at risk for further infections if stones continue to be an issue.  - reviewed that it is possible he may always be colonized with Pseudomonas and Enterococcus due to neobladder. However clinically these were causing active infection  - f/u with Urology and Nephrology as scheduled  - f/u with ID as needed. If subsequent urinalyses are suspicious, will be available.       Kindra Mann PA-C  Infectious Diseases  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 Currently admitted for fever, back pain, and hematuria.     - August 2022 began having hematuria and passing stone/a clip with subsequent urinary infections  - urine cultures persistent for Pseudomonas and Enterococcus. Received several courses of Cipro and Amoxicillin which would improved his symptoms temporarily, but then he would start feeling poorly shortly after completion  - 3/22/23 he was admitted for again persistent urine cultures with Pseudomonas and Enterococcus  - CT A/P showed a pelvic mass and nephrolithiasis in the  "distal right ureter  - 3/27 s/p IR biopsy of "mass" which ended up being a seroma. 60cc of fluid aspirated with post-procedure scan showing lesion completely resolved. Cultures were NGTD  - he was treated with IV Zosyn for 3+ weeks for complicated UTI which ended on 4/14  - f/u scan on 4/13/23 showed small stone in right kidney and likely small stones in the bladder. Hydronephrosis resolved. Stable collection in the left pelvis (which had previously been aspirated and found to be seroma)  - Discussed with Urology Dr. Russell, no further action at the time.  - 4/17 urine culture (obtained by Neprhology) grew E. Faecalis again. Unclear if received additional treatment  - 5/6/23 patient began having hematuria and right-sided upper back pain. He thought he might be passing the stone  - 5/8/23 developed fever and chills and overall not feeling well, so presented to [facility]. Febrile to 102.7F with leukocytosis and BERTHA  - CT renal showed Mild hydronephrosis and hydroureter with thickening of the collecting system as well as renal pelvis, Questionable stricture at the anastomosis with the ileal conduit, and Similar appearing fluid collection along the left hemipelvis measuring 6.4 x 4.1 cm  - he was started on IV Zosyn and admitted  - 5/8 Urine cultures with GNR and Enterococcus spp again  - Blood cultures also positive for GNR     Infectious Diseases has been consulted for evaluation and management.        Past Medical History:   Diagnosis Date    Bladder cancer     History of hypertension        Past Surgical History:   Procedure Laterality Date    BLADDER SURGERY      removal with new one made    chemo treatment      done 12/04/2019    NEPHRECTOMY Left 05/2021    PROSTATE SURGERY  06/18/2019    pat reported       Family History   Problem Relation Age of Onset    Cancer Mother         possible lung    Hypertension Mother     Cancer Maternal Uncle         lung    No Known Problems Sister     No Known Problems Brother     No " Known Problems Maternal Grandmother     Heart disease Maternal Grandfather     No Known Problems Paternal Grandfather        Social History     Socioeconomic History    Marital status:      Spouse name: Janae    Number of children: 2   Tobacco Use    Smoking status: Former     Types: Cigarettes     Quit date: 2014     Years since quittin.1    Smokeless tobacco: Current     Types: Chew    Tobacco comments:     still chew tobacco rarely   Substance and Sexual Activity    Alcohol use: Yes     Alcohol/week: 3.0 - 6.0 standard drinks     Types: 3 - 6 Cans of beer per week     Comment: weekends    Drug use: Never    Sexual activity: Yes     Partners: Female     Birth control/protection: None     Social Determinants of Health     Financial Resource Strain: Low Risk     Difficulty of Paying Living Expenses: Not hard at all   Food Insecurity: No Food Insecurity    Worried About Running Out of Food in the Last Year: Never true    Ran Out of Food in the Last Year: Never true   Transportation Needs: No Transportation Needs    Lack of Transportation (Medical): No    Lack of Transportation (Non-Medical): No   Physical Activity: Inactive    Days of Exercise per Week: 0 days    Minutes of Exercise per Session: 0 min   Stress: No Stress Concern Present    Feeling of Stress : Only a little   Social Connections: Moderately Integrated    Frequency of Communication with Friends and Family: Once a week    Frequency of Social Gatherings with Friends and Family: Never    Attends Protestant Services: More than 4 times per year    Active Member of Clubs or Organizations: Yes    Attends Club or Organization Meetings: Never    Marital Status:    Housing Stability: Low Risk     Unable to Pay for Housing in the Last Year: No    Number of Places Lived in the Last Year: 1    Unstable Housing in the Last Year: No       Review of patient's allergies indicates:  No Known Allergies    Current Outpatient Medications   Medication  Instructions    acetaminophen (TYLENOL) 1,000 mg, Oral, Every 6 hours PRN    amLODIPine (NORVASC) 5 mg, Oral, Nightly    b complex vitamins capsule 1 capsule, Oral, Daily    cyanocobalamin, vitamin B-12, (VITAMIN B-12 ORAL) 1 tablet, Oral, Daily    fluticasone propionate (FLONASE) 50 mcg/actuation nasal spray 1 spray, Each Nostril, Daily PRN    Lactobacillus rhamnosus GG (CULTURELLE) 10 billion cell capsule 1 capsule, Oral, Daily    multivit-minerals/folic acid (THERALOGIX  ORAL) 1 Dose, Oral, 2 times daily, citralith         Review of Systems   Constitutional:  Negative for activity change, appetite change, chills, fatigue and fever.   HENT:  Negative for congestion, mouth sores, sinus pain and sore throat.    Eyes:  Negative for photophobia and visual disturbance.   Respiratory:  Negative for cough, chest tightness, shortness of breath and wheezing.    Cardiovascular:  Negative for chest pain, palpitations and leg swelling.   Gastrointestinal:  Negative for abdominal pain, diarrhea, nausea and vomiting.   Genitourinary:  Negative for dysuria, flank pain, hematuria and urgency.   Musculoskeletal:  Negative for arthralgias, myalgias, neck pain and neck stiffness.   Skin:  Negative for color change and rash.   Neurological:  Negative for dizziness, seizures and headaches.   Psychiatric/Behavioral:  Negative for confusion.          Objective:     Vitals:    06/06/23 0931   BP: (!) 148/85   Pulse: 71   Resp: 20   Temp: 98.4 °F (36.9 °C)              Physical Exam  Vitals and nursing note reviewed.   Constitutional:       General: He is awake. He is not in acute distress.     Appearance: He is well-developed and well-groomed. He is not diaphoretic.   HENT:      Head: Normocephalic and atraumatic.      Right Ear: External ear normal.      Left Ear: External ear normal.      Nose: Nose normal.   Eyes:      General: No scleral icterus.        Right eye: No discharge.         Left eye: No discharge.      Pupils:  Pupils are equal, round, and reactive to light.   Cardiovascular:      Rate and Rhythm: Normal rate and regular rhythm.   Pulmonary:      Effort: Pulmonary effort is normal. No accessory muscle usage or respiratory distress.      Breath sounds: Normal breath sounds.   Abdominal:      General: There is no distension.   Musculoskeletal:      Cervical back: Normal range of motion and neck supple.   Skin:     General: Skin is warm and dry.      Comments: PICC line RUE without surrounding edema, erythema.   Neurological:      General: No focal deficit present.      Mental Status: He is alert and oriented to person, place, and time.   Psychiatric:         Mood and Affect: Mood normal.         Behavior: Behavior normal.         Estimated Creatinine Clearance: 44.9 mL/min (A) (based on SCr of 1.94 mg/dL (H)).      Microbiology Results (last 7 days)       ** No results found for the last 168 hours. **              Significant Labs: All pertinent labs within the past 24 hours have been reviewed.     Significant Imaging: I have reviewed all relevant and available imaging results/findings within the past 24 hours.      Plan -- see top of note

## 2023-06-07 ENCOUNTER — TELEPHONE (OUTPATIENT)
Dept: INFECTIOUS DISEASES | Facility: CLINIC | Age: 63
End: 2023-06-07
Payer: COMMERCIAL

## 2023-06-07 NOTE — TELEPHONE ENCOUNTER
Notified [facility] pt was seen 6/6/23, PICC line was pulled in the office, dc abx, PICC care and weekly labs.

## 2023-06-14 ENCOUNTER — PATIENT MESSAGE (OUTPATIENT)
Dept: INFECTIOUS DISEASES | Facility: CLINIC | Age: 63
End: 2023-06-14
Payer: COMMERCIAL

## 2023-06-26 PROBLEM — N17.9 AKI (ACUTE KIDNEY INJURY): Status: RESOLVED | Noted: 2023-03-22 | Resolved: 2023-06-26

## 2023-06-26 PROBLEM — N39.0 UTI (URINARY TRACT INFECTION): Status: RESOLVED | Noted: 2023-03-22 | Resolved: 2023-06-26

## 2023-08-14 PROBLEM — N12 PYELONEPHRITIS: Status: RESOLVED | Noted: 2023-05-09 | Resolved: 2023-08-14

## 2023-08-14 PROBLEM — N39.0 COMPLICATED UTI (URINARY TRACT INFECTION): Status: RESOLVED | Noted: 2023-03-23 | Resolved: 2023-08-14

## 2023-10-02 DIAGNOSIS — U07.1 COVID-19 VIRUS DETECTED: ICD-10-CM

## 2023-10-03 ENCOUNTER — NURSE TRIAGE (OUTPATIENT)
Dept: ADMINISTRATIVE | Facility: CLINIC | Age: 63
End: 2023-10-03
Payer: COMMERCIAL

## 2023-10-03 NOTE — TELEPHONE ENCOUNTER
OOC RN Harry Sabillon.    [facility] COVID POSITIVE 10/2/23   Today, has sore throat, swallowing hurts.  Saw NP  yesterday.  Took tylenol for fever of 101.     On the paxlovid,  and cough medicine.  Care advise discuss with PCP and callback by Rn within 1 hour.  For any new or worsening symptoms to callback OOC Rn  number giver and delio Malloy.;       Reason for Disposition   Fever > 101 F (38.3 C) and over 60 years of age    Additional Information   Negative: SEVERE difficulty breathing (e.g., struggling for each breath, speaks in single words)   Negative: Difficult to awaken or acting confused (e.g., disoriented, slurred speech)   Negative: Bluish (or gray) lips or face now   Negative: Shock suspected (e.g., cold/pale/clammy skin, too weak to stand, low BP, rapid pulse)   Negative: Sounds like a life-threatening emergency to the triager   Negative: SEVERE or constant chest pain or pressure  (Exception: Mild central chest pain, present only when coughing.)   Negative: MODERATE difficulty breathing (e.g., speaks in phrases, SOB even at rest, pulse 100-120)   Negative: Headache and stiff neck (can't touch chin to chest)   Negative: Oxygen level (e.g., pulse oximetry) 90% or lower   Negative: Chest pain or pressure  (Exception: MILD central chest pain, present only when coughing.)   Negative: Drinking very little and dehydration suspected (e.g., no urine > 12 hours, very dry mouth, very lightheaded)   Negative: Patient sounds very sick or weak to the triager   Negative: MILD difficulty breathing (e.g., minimal/no SOB at rest, SOB with walking, pulse <100)   Negative: Fever > 103 F (39.4 C)    Protocols used: Coronavirus (COVID-19) Diagnosed or [id number]

## 2024-02-15 ENCOUNTER — TELEPHONE (OUTPATIENT)
Dept: HEMATOLOGY/ONCOLOGY | Facility: CLINIC | Age: 64
End: 2024-02-15
Payer: COMMERCIAL

## 2024-02-15 NOTE — TELEPHONE ENCOUNTER
----- Message from Sunni Garcia RN sent at 2/15/2024  4:12 PM CST -----  Regarding: FW: appointment  Contact: patient    ----- Message -----  From: Samantha Encarnacion  Sent: 2/15/2024   2:21 PM CST  To: Jacqueline Méndez Staff  Subject: appointment                                      Type:  Sooner Appointment Request    Caller is requesting a sooner appointment.  Caller declined first available appointment listed below.  Caller will not accept being placed on the waitlist and is requesting a message be sent to doctor.    Name of Caller:  patient  When is the first available appointment?    Symptoms:  cyst on face  Would the patient rather a call back or a response via [facility]? call  Best Call Back Number:  [phone] (home)     Additional Information:  Please call patient to advise.  Thanks!

## 2024-02-16 ENCOUNTER — TELEPHONE (OUTPATIENT)
Dept: HEMATOLOGY/ONCOLOGY | Facility: CLINIC | Age: 64
End: 2024-02-16
Payer: COMMERCIAL

## 2024-02-16 NOTE — NURSING
Attempted to contact patient regarding referral for Dr. Phillips. Left msg on VM requesting a callback to navigation.    Oncology Navigation   Intake  Cancer Type: Head and Neck  Type of Referral: Internal  Date of Referral: 02/15/24  Initial Nurse Navigator Contact: 02/16/24  Referral to Initial Contact Timeline (days): 1  Date Worked: 02/16/24     Treatment     Surgical Oncologist: Dr. Honey Phillips (H&N surg onc)                          Acuity      Follow Up  No follow-ups on file.

## 2024-02-16 NOTE — TELEPHONE ENCOUNTER
----- Message from Sunni Garcia RN sent at 2/15/2024  4:12 PM CST -----  Regarding: FW: appointment  Contact: patient    ----- Message -----  From: Samantha Encarnacion  Sent: 2/15/2024   2:21 PM CST  To: [facility] Staff  Subject: appointment                                      Type:  Sooner Appointment Request    Caller is requesting a sooner appointment.  Caller declined first available appointment listed below.  Caller will not accept being placed on the waitlist and is requesting a message be sent to doctor.    Name of Caller:  patient  When is the first available appointment?    Symptoms:  cyst on face  Would the patient rather a call back or a response via [facility]? call  Best Call Back Number:  [phone] (home)     Additional Information:  Please call patient to advise.  Thanks!

## 2024-02-19 ENCOUNTER — TELEPHONE (OUTPATIENT)
Dept: HEMATOLOGY/ONCOLOGY | Facility: CLINIC | Age: 64
End: 2024-02-19
Payer: COMMERCIAL

## 2024-02-19 NOTE — NURSING
Contacted patient and scheduled him to see Dr. Phillips for a facial cyst. He is unable to get in with Dr. Moreno or Dr. Headley until May and he requests a sooner appt. Reviewed appt date, time and location and he verbalized understanding.

## 2024-02-19 NOTE — TELEPHONE ENCOUNTER
See previous note    ----- Message from Gayle Courtney sent at 2/19/2024 12:34 PM CST -----  Contact: self  Type:  Patient Returning Call    Who Called: Pt   Who Left Message for Patient: Sissy WONG   Does the patient know what this is regarding?: Referral for Dr. Phillips   Best Call Back Number: [phone]  Additional Information:   Cancer Type: Head and Neck  Type of Referral: Internal  Date of Referral: 02/15/24  Treatment  Surgical Oncologist: Dr. Honey Phillips (H&N surg onc)  Please call pt to advise/schedule pt... Thank you...

## 2024-02-22 PROBLEM — R22.0 FACIAL MASS: Status: ACTIVE | Noted: 2024-02-22

## 2024-03-04 ENCOUNTER — OFFICE VISIT (OUTPATIENT)
Dept: HEMATOLOGY/ONCOLOGY | Facility: CLINIC | Age: 64
End: 2024-03-04
Payer: COMMERCIAL

## 2024-03-04 VITALS
HEART RATE: 71 BPM | DIASTOLIC BLOOD PRESSURE: 94 MMHG | OXYGEN SATURATION: 100 % | SYSTOLIC BLOOD PRESSURE: 149 MMHG | BODY MASS INDEX: 32.81 KG/M2 | HEIGHT: 68 IN | TEMPERATURE: 97 F | RESPIRATION RATE: 17 BRPM | WEIGHT: 216.5 LBS

## 2024-03-04 DIAGNOSIS — L72.0 EPIDERMAL CYST OF FACE: ICD-10-CM

## 2024-03-04 DIAGNOSIS — R22.0 FACIAL MASS: Primary | ICD-10-CM

## 2024-03-04 PROCEDURE — 99999 PR PBB SHADOW E&M-EST. PATIENT-LVL IV: CPT | Mod: PBBFAC,,, | Performed by: STUDENT IN AN ORGANIZED HEALTH CARE EDUCATION/TRAINING PROGRAM

## 2024-03-04 PROCEDURE — 3080F DIAST BP >= 90 MM HG: CPT | Mod: CPTII,S$GLB,, | Performed by: STUDENT IN AN ORGANIZED HEALTH CARE EDUCATION/TRAINING PROGRAM

## 2024-03-04 PROCEDURE — 99204 OFFICE O/P NEW MOD 45 MIN: CPT | Mod: S$GLB,,, | Performed by: STUDENT IN AN ORGANIZED HEALTH CARE EDUCATION/TRAINING PROGRAM

## 2024-03-04 PROCEDURE — 3077F SYST BP >= 140 MM HG: CPT | Mod: CPTII,S$GLB,, | Performed by: STUDENT IN AN ORGANIZED HEALTH CARE EDUCATION/TRAINING PROGRAM

## 2024-03-04 PROCEDURE — 3008F BODY MASS INDEX DOCD: CPT | Mod: CPTII,S$GLB,, | Performed by: STUDENT IN AN ORGANIZED HEALTH CARE EDUCATION/TRAINING PROGRAM

## 2024-03-04 PROCEDURE — 1159F MED LIST DOCD IN RCRD: CPT | Mod: CPTII,S$GLB,, | Performed by: STUDENT IN AN ORGANIZED HEALTH CARE EDUCATION/TRAINING PROGRAM

## 2024-03-04 PROCEDURE — 4010F ACE/ARB THERAPY RXD/TAKEN: CPT | Mod: CPTII,S$GLB,, | Performed by: STUDENT IN AN ORGANIZED HEALTH CARE EDUCATION/TRAINING PROGRAM

## 2024-03-04 RX ORDER — DIAZEPAM 5 MG/1
5 TABLET ORAL ONCE AS NEEDED
Qty: 2 TABLET | Refills: 0 | Status: SHIPPED | OUTPATIENT
Start: 2024-03-04 | End: 2024-05-15 | Stop reason: CLARIF

## 2024-03-04 NOTE — PROGRESS NOTES
Note to patients: In accordance with the  Century Cures Act, patients are now granted immediate electronic access to their medical records. This note is primarily intended for communication among medical professionals. As a result, it may incorporate medical terminology, abbreviations, or language that could appear blunt or unfamiliar. If you have questions about this document, we encourage you to discuss it with your physician.      [facility]  Head & Neck Surgical Oncology [facility]    Patient: Ab Lomas    : 1960    MRN: [id number]  Primary Care Provider: Pattie, Primary Doctor  Referring Provider: Monique Roche MD   Rad Onc:N/A  Med Onc:N/A  Date of Encounter: 2024    DIAGNOSIS:    Cancer Staging   No matching staging information was found for the patient.    CC:   Chief Complaint   Patient presents with    Hematology and Oncology     New Patient         HPI:   Ab Lomas is a 63 y.o. male with a past medical history significant HTN and bladder cancer who is here for a mass on the left side of the face for the past 20+ years that he would like removed. He reports that it has never appeared red, hot or tender, has never drained any fluid or pus, and has slowly enlarged with time. He has a history of others cysts removed near his ears that have all come back benign, including an ipsilateral cyst on his lobule very close to the current cyst. They had all been removed by general surgeon. He went to see Dr. Roche to have it removed and due to the location she referred him here to discuss surgery.     Patient denies pain, fever, chills, night sweats, unintentional weight loss, neck mass, enlarged lymph nodes outside of the head or neck, odynophagia, dysphagia, globus sensation, voice changes, cough, hemoptysis, shortness of breath, or new or concerning skin lesions. Patient denies personal or family history of head and neck cancer. Patient denies history of  radiation exposure.    Patient is retired. Patient lives in [place].    TREATMENT HISTORY:  N/A    SUBSTANCE USE:  Smoking: former, quit in 2014; still chews tobacco rarely  Alcohol: denies  Denies hookah, chewing tobacco, marijuana, betel nut, illicit drug, heavy cigar, vape use.    ALLERGIES:  Review of patient's allergies indicates:  No Known Allergies      MEDICATIONS:    Current Outpatient Medications:     acetaminophen (TYLENOL) 500 MG tablet, Take 1,000 mg by mouth every 6 (six) hours as needed for Pain., Disp: , Rfl:     b complex vitamins capsule, Take 1 capsule by mouth once daily., Disp: , Rfl:     cyanocobalamin, vitamin B-12, (VITAMIN B-12 ORAL), Take 1 tablet by mouth once daily., Disp: , Rfl:     fluticasone propionate (FLONASE) 50 mcg/actuation nasal spray, 1 spray by Each Nostril route daily as needed for Allergies., Disp: , Rfl:     olmesartan (BENICAR) 20 MG tablet, Take 20 mg by mouth once daily., Disp: , Rfl:     sodium bicarbonate 650 MG tablet, Take 650 mg by mouth once daily., Disp: , Rfl:     diazePAM (VALIUM) 5 MG tablet, Take 1 tablet (5 mg total) by mouth 1 (one) time if needed for Anxiety. Take 1 tablet as needed for anxiety prior to MRI, Disp: 2 tablet, Rfl: 0    PAST MEDICAL HISTORY:  Past Medical History:   Diagnosis Date    Bladder cancer     History of hypertension         PAST SURGICAL HISTORY:  Past Surgical History:   Procedure Laterality Date    BLADDER SURGERY      removal with new one made    chemo treatment      done 12/04/2019    NEPHRECTOMY Left 05/2021    PROSTATE SURGERY  06/18/2019    pat reported        FAMILY HISTORY:  Family History   Problem Relation Age of Onset    Cancer Mother         possible lung    Hypertension Mother     Cancer Maternal Uncle         lung    No Known Problems Sister     No Known Problems Brother     No Known Problems Maternal Grandmother     Heart disease Maternal Grandfather     No Known Problems Paternal Grandfather        SOCIAL  HISTORY:  Social History     Socioeconomic History    Marital status:      Spouse name: Janae    Number of children: 2   Tobacco Use    Smoking status: Former     Current packs/day: 0.00     Types: Cigarettes     Quit date: 2014     Years since quittin.9    Smokeless tobacco: Current     Types: Chew    Tobacco comments:     still chew tobacco rarely   Substance and Sexual Activity    Alcohol use: Yes     Alcohol/week: 3.0 - 6.0 standard drinks of alcohol     Types: 3 - 6 Cans of beer per week     Comment: weekends    Drug use: Never    Sexual activity: Yes     Partners: Female     Birth control/protection: None     Social Determinants of Health     Financial Resource Strain: Low Risk  (2023)    Overall Financial Resource Strain (CARDIA)     Difficulty of Paying Living Expenses: Not hard at all   Food Insecurity: No Food Insecurity (2023)    Hunger Vital Sign     Worried About Running Out of Food in the Last Year: Never true     Ran Out of Food in the Last Year: Never true   Transportation Needs: No Transportation Needs (2023)    PRAPARE - Transportation     Lack of Transportation (Medical): No     Lack of Transportation (Non-Medical): No   Physical Activity: Inactive (2023)    Exercise Vital Sign     Days of Exercise per Week: 0 days     Minutes of Exercise per Session: 0 min   Stress: No Stress Concern Present (2023)    [employer] [place] [employer] - Occupational Stress Questionnaire     Feeling of Stress : Only a little   Social Connections: Moderately Integrated (2023)    Social Connection and Isolation Panel [NHANES]     Frequency of Communication with Friends and Family: Once a week     Frequency of Social Gatherings with Friends and Family: Never     Attends Amish Services: More than 4 times per year     Active Member of Clubs or Organizations: Yes     Attends Club or Organization Meetings: Never     Marital Status:    Housing Stability: Low Risk   "(5/8/2023)    Housing Stability Vital Sign     Unable to Pay for Housing in the Last Year: No     Number of Places Lived in the Last Year: 1     Unstable Housing in the Last Year: No     See above substance history    REVIEW OF SYSTEMS:   Comprehensive review of systems was discussed with the patient.  It is positive only for the above complaints.    PHYSICAL EXAMINATION:  Blood pressure (!) 149/94, pulse 71, temperature 96.7 °F (35.9 °C), temperature source Temporal, resp. rate 17, height 5' 8" (1.727 m), weight 98.2 kg (216 lb 7.9 oz), SpO2 100 %.    Constitutional: Non-toxic appearing.   Psychiatric: Appropriate mood and affect. Cooperative.  Voice: Non-dysphonic, speaking in full sentences.   Neurologic: Cranial nerves grossly intact, no focal deficits.  Head and face: Salivary glands are not enlarged. Face is symmetric. CN VII strength intact. 1x1.5cm mass on left side anterior to tragus   Skin: No concerning skin lesions.   Eyes: Vision grossly intact, bilateral extraocular movements intact  Ears: Bilateral pinna, mastoid, external ear canal normal. Hearing intact.   Nose: External nose appears normal.   Lips: No ulcers or lesions  Oral cavity: Mucosa is pink and moist. Buccal mucosa, gingiva, anterior tongue, floor of mouth, and hard palate appear normal. No leukoplakia, erythroplakia, ulceration, masses or lesions.  Oropharynx: Mucosa is pink and moist. Soft palate and base of tongue are normal. Posterior pharyngeal wall normal. Tonsils are normal. No lesions.  Neck: Soft and flat,  no masses or lymphadenopathy. No palpable thyroid enlargement or nodules.  Respiratory: Chest expansion symmetric, no audible stridor or stertor. Breathing is unlabored. No active cough.    CLINICAL PHOTOS:      PROCEDURES:   N/A    DATA REVIEWED:     LABORATORY:  N/A    PATHOLOGY:  N/A    IMAGING:  N/A      ASSESSMENT AND PLAN:  1. Facial mass    2. Epidermal cyst of face         Ab Lomas is a 63 y.o. male with facial " mass.   -epidermal cyst vs branchial cleft cyst. Discussed in detail the etiology of branchial cleft cysts, as these typically are much deeper and require a more advanced resection to prevent recurrence  -will order MRI since patient unable to receive contrast due to one kidney  -plan on either removal in OR if deep or possible removal in the office if not deep. Other option may be try to drain the cyst if deep and he does not want to proceed with surgery in the OR.     Orders Placed This Encounter   Procedures    MRI Maxillofacial W W/O Contrast      Patient encouraged to call with any questions, concerns, or new or worsening symptoms.     Follow up, will call with MRI results     Patient seen by and note written by Swapna Hassan

## 2024-03-20 DIAGNOSIS — R22.0 FACIAL MASS: Primary | ICD-10-CM

## 2024-03-21 ENCOUNTER — HOSPITAL ENCOUNTER (OUTPATIENT)
Dept: RADIOLOGY | Facility: HOSPITAL | Age: 64
Discharge: HOME | End: 2024-03-21
Attending: STUDENT IN AN ORGANIZED HEALTH CARE EDUCATION/TRAINING PROGRAM
Payer: COMMERCIAL

## 2024-03-21 DIAGNOSIS — R22.0 FACIAL MASS: ICD-10-CM

## 2024-03-21 PROCEDURE — 70543 MRI ORBT/FAC/NCK W/O &W/DYE: CPT | Mod: 26,,, | Performed by: RADIOLOGY

## 2024-03-21 PROCEDURE — 70543 MRI ORBT/FAC/NCK W/O &W/DYE: CPT | Mod: TC,PO

## 2024-03-22 ENCOUNTER — TELEPHONE (OUTPATIENT)
Dept: HEMATOLOGY/ONCOLOGY | Facility: CLINIC | Age: 64
End: 2024-03-22
Payer: COMMERCIAL

## 2024-03-22 NOTE — TELEPHONE ENCOUNTER
----- Message from Honey Phillips MD sent at 3/21/2024 11:49 AM CDT -----  Swapna please call patient to discuss MRI results - can be scheduled for Monday PM procedure if he still wants    Sunni CPT 88014    Reviewed with patient on phone. He would like to schedule an in office procedure to have the cyst removed. I will forward this to Sunni who will reach out for a time and date.

## 2024-03-28 ENCOUNTER — TELEPHONE (OUTPATIENT)
Dept: SURGERY | Facility: CLINIC | Age: 64
End: 2024-03-28
Payer: COMMERCIAL

## 2024-03-28 NOTE — TELEPHONE ENCOUNTER
Called patient to confirm cancellation -- pt would like to reschedule for 4/4 @ 3:30pm    Pt v/u of call -- answered questions to patient's satisfaction -- advised to call the clinic with any questions or concerns.    ----- Message from Dionne Carlisle, Patient Care Assistant sent at 3/28/2024  8:02 AM CDT -----  Type: Needs Medical Advice  Who Called:  devon  Best Call Back Number: [phone]    Additional Information: devon is wanting to reschedule his 4/1 to another day please call to further discuss thank you .

## 2024-04-08 ENCOUNTER — DOCUMENTATION ONLY (OUTPATIENT)
Dept: HEMATOLOGY/ONCOLOGY | Facility: CLINIC | Age: 64
End: 2024-04-08
Payer: COMMERCIAL

## 2024-04-08 ENCOUNTER — PROCEDURE VISIT (OUTPATIENT)
Dept: HEMATOLOGY/ONCOLOGY | Facility: CLINIC | Age: 64
End: 2024-04-08
Payer: COMMERCIAL

## 2024-04-08 VITALS
RESPIRATION RATE: 16 BRPM | TEMPERATURE: 97 F | SYSTOLIC BLOOD PRESSURE: 139 MMHG | HEIGHT: 68 IN | BODY MASS INDEX: 32.55 KG/M2 | HEART RATE: 94 BPM | WEIGHT: 214.75 LBS | DIASTOLIC BLOOD PRESSURE: 90 MMHG | OXYGEN SATURATION: 98 %

## 2024-04-08 DIAGNOSIS — L72.0 EPIDERMAL CYST OF FACE: ICD-10-CM

## 2024-04-08 DIAGNOSIS — R22.0 FACIAL MASS: Primary | ICD-10-CM

## 2024-04-08 NOTE — PROCEDURES
PROCEDURE: Elliptical excision with intermediate layered repair    REFERRING PROVIDER:  Dr. Roche    ANESTHETIC: 10 cc 1% Lidocaine with Epinephrine 1:100,000    SURGICAL PREP: Betadine    SURGEON: Honey Phillips MD    ASSISTANTS: Sunni Garcia RN    PREOPERATIVE DIAGNOSIS: Epidermal Inclusion Cyst    POSTOPERATIVE DIAGNOSIS: Epidermal Inclusion Cyst    PATHOLOGIC DIAGNOSIS: Pending    LOCATION: left face    INITIAL LESION SIZE: 1 cm    EXCISED DIAMETER: 2 cm    PREPARATION:  The diagnosis, procedure, alternatives, benefits and risks, including but not limited to: drug reactions, pain, scar or cosmetic defect, local sensation disturbances, and/or recurrence of present condition were explained to the patient. The patient elected to proceed.    PROCEDURE:  The area of the left ear/preauricular area was prepped, draped, and anesthetized in the usual sterile fashion. Lesional tissue was carefully marked prior to administration of the anesthesia. An elliptical excision was drawn down the central 3/4 of the lesion. A fusiform elliptical excision was done with a #15 blade carried down completely through the dermis into the subcutaneous tissues. Then, with a combination of blunt and sharp dissection, an intact epidermal cyst was removed and submitted for histologic evaluation. There was noted to be a small arterial bleed from the superior aspect of the cyst that was controlled with cautery. The operative site was widely undermined in the subcutaneous tissue plane. Then, electrocoagulation was used to obtain hemostasis. Blood loss was minimal. The wound was then approximated in a layered fashion with subcutaneous and intradermal 3-0 Vicryl buried vertical mattress sutures. The wound was then superficially closed with running 5-0 Ethilon sutures.    The patient tolerated the procedure well.    The area was cleaned and dressed appropriately and the patient was given wound care instructions, as well as an appointment for  "follow-up evaluation and suture removal in 7 days.    LENGTH OF REPAIR: 2 cm    Vitals:    04/08/24 1545   BP: (!) 139/90   BP Location: Right arm   Patient Position: Sitting   BP Method: Medium (Automatic)   Pulse: 94   Resp: 16   Temp: 97.4 °F (36.3 °C)   TempSrc: Temporal   SpO2: 98%   Weight: 97.4 kg (214 lb 11.7 oz)   Height: 5' 8" (1.727 m)       "

## 2024-04-15 ENCOUNTER — OFFICE VISIT (OUTPATIENT)
Dept: HEMATOLOGY/ONCOLOGY | Facility: CLINIC | Age: 64
End: 2024-04-15
Payer: COMMERCIAL

## 2024-04-15 VITALS
BODY MASS INDEX: 31.78 KG/M2 | WEIGHT: 209.69 LBS | HEART RATE: 92 BPM | HEIGHT: 68 IN | TEMPERATURE: 98 F | SYSTOLIC BLOOD PRESSURE: 123 MMHG | RESPIRATION RATE: 16 BRPM | DIASTOLIC BLOOD PRESSURE: 91 MMHG | OXYGEN SATURATION: 99 %

## 2024-04-15 DIAGNOSIS — R22.0 FACIAL MASS: Primary | ICD-10-CM

## 2024-04-15 PROCEDURE — 1159F MED LIST DOCD IN RCRD: CPT | Mod: CPTII,S$GLB,, | Performed by: NURSE PRACTITIONER

## 2024-04-15 PROCEDURE — 3074F SYST BP LT 130 MM HG: CPT | Mod: CPTII,S$GLB,, | Performed by: NURSE PRACTITIONER

## 2024-04-15 PROCEDURE — 3080F DIAST BP >= 90 MM HG: CPT | Mod: CPTII,S$GLB,, | Performed by: NURSE PRACTITIONER

## 2024-04-15 PROCEDURE — 99024 POSTOP FOLLOW-UP VISIT: CPT | Mod: S$GLB,,, | Performed by: NURSE PRACTITIONER

## 2024-04-15 PROCEDURE — 4010F ACE/ARB THERAPY RXD/TAKEN: CPT | Mod: CPTII,S$GLB,, | Performed by: NURSE PRACTITIONER

## 2024-04-15 PROCEDURE — 99999 PR PBB SHADOW E&M-EST. PATIENT-LVL IV: CPT | Mod: PBBFAC,,, | Performed by: NURSE PRACTITIONER

## 2024-04-15 NOTE — PROGRESS NOTES
Note to patients: In accordance with the  Century Cures Act, patients are now granted immediate electronic access to their medical records. This note is primarily intended for communication among medical professionals. As a result, it may incorporate medical terminology, abbreviations, or language that could appear blunt or unfamiliar. If you have questions about this document, we encourage you to discuss it with your physician.      [facility]  Head & Neck Surgical Oncology Clinic    Patient: Ab Lomas    : 1960    MRN: [id number]  Primary Care Provider: No, Primary Doctor  Referring Provider: No ref. provider found   Rad Onc:N/A  Med Onc:N/A  Date of Encounter: 04/15/2024    DIAGNOSIS:    Cancer Staging   No matching staging information was found for the patient.      CC:   Chief Complaint   Patient presents with    facial mass        HPI:   Ab Lomas is a 63 y.o. male with a past medical history significant HTN and bladder cancer who is here for a mass on the left side of the face for the past 20+ years that he would like removed. He reports that it has never appeared red, hot or tender, has never drained any fluid or pus, and has slowly enlarged with time. He has a history of others cysts removed near his ears that have all come back benign, including an ipsilateral cyst on his lobule very close to the current cyst. They had all been removed by general surgeon. He went to see Dr. Roche to have it removed and due to the location she referred him here to discuss surgery.     Patient denies pain, fever, chills, night sweats, unintentional weight loss, neck mass, enlarged lymph nodes outside of the head or neck, odynophagia, dysphagia, globus sensation, voice changes, cough, hemoptysis, shortness of breath, or new or concerning skin lesions. Patient denies personal or family history of head and neck cancer. Patient denies history of radiation exposure.    Patient is  retired. Patient lives in [place].    Patient is here today for his post-op visit. He denies any concerns or issues with his incision site. He denies any pain. He is applying Aquaphor to the area.     TREATMENT HISTORY:  N/A    SUBSTANCE USE:  Smoking: former, quit in 2014; still chews tobacco rarely  Alcohol: denies  Denies hookah, chewing tobacco, marijuana, betel nut, illicit drug, heavy cigar, vape use.    ALLERGIES:  Review of patient's allergies indicates:  No Known Allergies      MEDICATIONS:    Current Outpatient Medications:     acetaminophen (TYLENOL) 500 MG tablet, Take 1,000 mg by mouth every 6 (six) hours as needed for Pain., Disp: , Rfl:     b complex vitamins capsule, Take 1 capsule by mouth once daily., Disp: , Rfl:     cyanocobalamin, vitamin B-12, (VITAMIN B-12 ORAL), Take 1 tablet by mouth once daily., Disp: , Rfl:     diazePAM (VALIUM) 5 MG tablet, Take 1 tablet (5 mg total) by mouth 1 (one) time if needed for Anxiety. Take 1 tablet as needed for anxiety prior to MRI, Disp: 2 tablet, Rfl: 0    fluticasone propionate (FLONASE) 50 mcg/actuation nasal spray, 1 spray by Each Nostril route daily as needed for Allergies., Disp: , Rfl:     olmesartan (BENICAR) 20 MG tablet, Take 20 mg by mouth once daily., Disp: , Rfl:     sodium bicarbonate 650 MG tablet, Take 650 mg by mouth once daily., Disp: , Rfl:     PAST MEDICAL HISTORY:  Past Medical History:   Diagnosis Date    Bladder cancer     History of hypertension         PAST SURGICAL HISTORY:  Past Surgical History:   Procedure Laterality Date    BLADDER SURGERY      removal with new one made    chemo treatment      done 12/04/2019    NEPHRECTOMY Left 05/2021    PROSTATE SURGERY  06/18/2019    pat reported        FAMILY HISTORY:  Family History   Problem Relation Name Age of Onset    Cancer Mother          possible lung    Hypertension Mother      Cancer Maternal Uncle          lung    No Known Problems Sister      No Known Problems Brother      No  Known Problems Maternal Grandmother      Heart disease Maternal Grandfather      No Known Problems Paternal Grandfather         SOCIAL HISTORY:  Social History     Socioeconomic History    Marital status:      Spouse name: Janae    Number of children: 2   Tobacco Use    Smoking status: Former     Current packs/day: 0.00     Types: Cigarettes     Quit date: 4/1/2014     Years since quitting: 10.0    Smokeless tobacco: Current     Types: Chew    Tobacco comments:     still chew tobacco rarely   Substance and Sexual Activity    Alcohol use: Yes     Alcohol/week: 3.0 - 6.0 standard drinks of alcohol     Types: 3 - 6 Cans of beer per week     Comment: weekends    Drug use: Never    Sexual activity: Yes     Partners: Female     Birth control/protection: None     Social Determinants of Health     Financial Resource Strain: Low Risk  (5/8/2023)    Overall Financial Resource Strain (CARDIA)     Difficulty of Paying Living Expenses: Not hard at all   Food Insecurity: No Food Insecurity (5/8/2023)    Hunger Vital Sign     Worried About Running Out of Food in the Last Year: Never true     Ran Out of Food in the Last Year: Never true   Transportation Needs: No Transportation Needs (5/8/2023)    PRAPARE - Transportation     Lack of Transportation (Medical): No     Lack of Transportation (Non-Medical): No   Physical Activity: Inactive (5/8/2023)    Exercise Vital Sign     Days of Exercise per Week: 0 days     Minutes of Exercise per Session: 0 min   Stress: No Stress Concern Present (5/8/2023)    [employer] [place] [employer] - Occupational Stress Questionnaire     Feeling of Stress : Only a little   Social Connections: Moderately Integrated (5/8/2023)    Social Connection and Isolation Panel [NHANES]     Frequency of Communication with Friends and Family: Once a week     Frequency of Social Gatherings with Friends and Family: Never     Attends Sikhism Services: More than 4 times per year     Active Member of  "Clubs or Organizations: Yes     Attends Club or Organization Meetings: Never     Marital Status:    Housing Stability: Low Risk  (5/8/2023)    Housing Stability Vital Sign     Unable to Pay for Housing in the Last Year: No     Number of Places Lived in the Last Year: 1     Unstable Housing in the Last Year: No     See above substance history    REVIEW OF SYSTEMS:   Comprehensive review of systems was discussed with the patient.  It is positive only for the above complaints.    PHYSICAL EXAMINATION:  Blood pressure (!) 123/91, pulse 92, temperature 97.6 °F (36.4 °C), temperature source Temporal, resp. rate 16, height 5' 8" (1.727 m), weight 95.1 kg (209 lb 10.5 oz), SpO2 99%.    Constitutional: Non-toxic appearing.   Psychiatric: Appropriate mood and affect. Cooperative.  Voice: Non-dysphonic, speaking in full sentences.   Neurologic: Cranial nerves grossly intact, no focal deficits.  Head and face: Salivary glands are not enlarged. Face is symmetric. CN VII strength intact. Incision c/d/I. Sutures removed.   Skin: No concerning skin lesions.   Eyes: Vision grossly intact, bilateral extraocular movements intact  Ears: Bilateral pinna, mastoid, external ear canal normal. Hearing intact.   Nose: External nose appears normal.   Lips: No ulcers or lesions  Oral cavity: Mucosa is pink and moist. Buccal mucosa, gingiva, anterior tongue, floor of mouth, and hard palate appear normal. No leukoplakia, erythroplakia, ulceration, masses or lesions.  Oropharynx: Mucosa is pink and moist. Soft palate and base of tongue are normal. Posterior pharyngeal wall normal. Tonsils are normal. No lesions.  Neck: Soft and flat,  no masses or lymphadenopathy. No palpable thyroid enlargement or nodules.  Respiratory: Chest expansion symmetric, no audible stridor or stertor. Breathing is unlabored. No active cough.    CLINICAL PHOTOS:  4/15/24          PROCEDURES:   N/A    DATA REVIEWED: "     LABORATORY:  N/A    PATHOLOGY:      IMAGING:  N/A      ASSESSMENT AND PLAN:  1. Facial mass    resolved    Ab Lomas is a 63 y.o. male with facial mass.   -incision healign well, sutures removed today  -pathology report reviewed  -f/u in 2 weeks for incision check    After the staples or sutures are removed apply Aquaphor to incision 2-3 times a day to keep the incision moist. Do this until the skin is fully healed, about 1-2 weeks.    Apply SPF 50+ sunscreen to the incision site to promote the best cosmetic result - sun will make the scar darker and more visible.    Two weeks following surgery, you can apply ScarAway to the incision site. You can cut this to size, removed it with bathing, wash it and reuse it. See instructions on package.  Remove ScarAway strip at night and apply Aquaphor and gently massage the wound.   If not ScarAway, you can use Mederma or other silicone strips designed for scar treatment. The brand does not matter.  You can do this indefinitely or until the scar is satisfactory.      Patient encouraged to call with any questions, concerns, or new or worsening symptoms.     Follow up in 2 week for incision check

## 2024-04-15 NOTE — PATIENT INSTRUCTIONS
After the staples or sutures are removed apply Aquaphor to incision 2-3 times a day to keep the incision moist. Do this until the skin is fully healed, about 1-2 weeks.    Apply SPF 50+ sunscreen to the incision site to promote the best cosmetic result - sun will make the scar darker and more visible.    Two weeks following surgery, you can apply ScarAway to the incision site. You can cut this to size, removed it with bathing, wash it and reuse it. See instructions on package.  Remove ScarAway strip at night and apply Aquaphor and gently massage the wound.   If not ScarAway, you can use Mederma or other silicone strips designed for scar treatment. The brand does not matter.  You can do this indefinitely or until the scar is satisfactory.

## 2024-04-29 ENCOUNTER — OFFICE VISIT (OUTPATIENT)
Dept: HEMATOLOGY/ONCOLOGY | Facility: CLINIC | Age: 64
End: 2024-04-29
Payer: COMMERCIAL

## 2024-04-29 VITALS
SYSTOLIC BLOOD PRESSURE: 115 MMHG | HEART RATE: 84 BPM | RESPIRATION RATE: 16 BRPM | DIASTOLIC BLOOD PRESSURE: 77 MMHG | HEIGHT: 68 IN | OXYGEN SATURATION: 99 % | TEMPERATURE: 97 F | BODY MASS INDEX: 32.05 KG/M2 | WEIGHT: 211.44 LBS

## 2024-04-29 DIAGNOSIS — L72.0 EPIDERMAL CYST OF FACE: ICD-10-CM

## 2024-04-29 DIAGNOSIS — R22.0 FACIAL MASS: Primary | ICD-10-CM

## 2024-04-29 PROCEDURE — 99024 POSTOP FOLLOW-UP VISIT: CPT | Mod: S$GLB,,, | Performed by: NURSE PRACTITIONER

## 2024-04-29 PROCEDURE — 3078F DIAST BP <80 MM HG: CPT | Mod: CPTII,S$GLB,, | Performed by: NURSE PRACTITIONER

## 2024-04-29 PROCEDURE — 99999 PR PBB SHADOW E&M-EST. PATIENT-LVL IV: CPT | Mod: PBBFAC,,, | Performed by: NURSE PRACTITIONER

## 2024-04-29 PROCEDURE — 3074F SYST BP LT 130 MM HG: CPT | Mod: CPTII,S$GLB,, | Performed by: NURSE PRACTITIONER

## 2024-04-29 PROCEDURE — 1159F MED LIST DOCD IN RCRD: CPT | Mod: CPTII,S$GLB,, | Performed by: NURSE PRACTITIONER

## 2024-04-29 PROCEDURE — 4010F ACE/ARB THERAPY RXD/TAKEN: CPT | Mod: CPTII,S$GLB,, | Performed by: NURSE PRACTITIONER

## 2024-04-29 NOTE — PROGRESS NOTES
Note to patients: In accordance with the  Century Cures Act, patients are now granted immediate electronic access to their medical records. This note is primarily intended for communication among medical professionals. As a result, it may incorporate medical terminology, abbreviations, or language that could appear blunt or unfamiliar. If you have questions about this document, we encourage you to discuss it with your physician.      [facility]  Head & Neck Surgical Oncology Clinic    Patient: Ab Lomas    : 1960    MRN: [id number]  Primary Care Provider: No, Primary Doctor  Referring Provider: No ref. provider found   Rad Onc:N/A  Med Onc:N/A  Date of Encounter: 2024    DIAGNOSIS:    Cancer Staging   No matching staging information was found for the patient.      CC:   Chief Complaint   Patient presents with    facial mass       HPI:   Ab Lomas is a 63 y.o. male with a past medical history significant HTN and bladder cancer who is here for a mass on the left side of the face for the past 20+ years that he would like removed. He reports that it has never appeared red, hot or tender, has never drained any fluid or pus, and has slowly enlarged with time. He has a history of others cysts removed near his ears that have all come back benign, including an ipsilateral cyst on his lobule very close to the current cyst. They had all been removed by general surgeon. He went to see Dr. Roche to have it removed and due to the location she referred him here to discuss surgery.     Patient denies pain, fever, chills, night sweats, unintentional weight loss, neck mass, enlarged lymph nodes outside of the head or neck, odynophagia, dysphagia, globus sensation, voice changes, cough, hemoptysis, shortness of breath, or new or concerning skin lesions. Patient denies personal or family history of head and neck cancer. Patient denies history of radiation exposure.    Patient is  retired. Patient lives in [place].    Patient is here today for his post-op visit. He denies any concerns or issues with his incision site. He denies any pain. He is applying Aquaphor to the area.     TREATMENT HISTORY:  N/A    SUBSTANCE USE:  Smoking: former, quit in 2014; still chews tobacco rarely  Alcohol: denies  Denies hookah, chewing tobacco, marijuana, betel nut, illicit drug, heavy cigar, vape use.    ALLERGIES:  Review of patient's allergies indicates:  No Known Allergies      MEDICATIONS:    Current Outpatient Medications:     acetaminophen (TYLENOL) 500 MG tablet, Take 1,000 mg by mouth every 6 (six) hours as needed for Pain., Disp: , Rfl:     b complex vitamins capsule, Take 1 capsule by mouth once daily., Disp: , Rfl:     cyanocobalamin, vitamin B-12, (VITAMIN B-12 ORAL), Take 1 tablet by mouth once daily., Disp: , Rfl:     diazePAM (VALIUM) 5 MG tablet, Take 1 tablet (5 mg total) by mouth 1 (one) time if needed for Anxiety. Take 1 tablet as needed for anxiety prior to MRI, Disp: 2 tablet, Rfl: 0    fluticasone propionate (FLONASE) 50 mcg/actuation nasal spray, 1 spray by Each Nostril route daily as needed for Allergies., Disp: , Rfl:     olmesartan (BENICAR) 20 MG tablet, Take 20 mg by mouth once daily., Disp: , Rfl:     sodium bicarbonate 650 MG tablet, Take 650 mg by mouth once daily., Disp: , Rfl:     PAST MEDICAL HISTORY:  Past Medical History:   Diagnosis Date    Bladder cancer     History of hypertension         PAST SURGICAL HISTORY:  Past Surgical History:   Procedure Laterality Date    BLADDER SURGERY      removal with new one made    chemo treatment      done 12/04/2019    NEPHRECTOMY Left 05/2021    PROSTATE SURGERY  06/18/2019    pat reported        FAMILY HISTORY:  Family History   Problem Relation Name Age of Onset    Cancer Mother          possible lung    Hypertension Mother      Cancer Maternal Uncle          lung    No Known Problems Sister      No Known Problems Brother      No  Known Problems Maternal Grandmother      Heart disease Maternal Grandfather      No Known Problems Paternal Grandfather         SOCIAL HISTORY:  Social History     Socioeconomic History    Marital status:      Spouse name: Janae    Number of children: 2   Tobacco Use    Smoking status: Former     Current packs/day: 0.00     Types: Cigarettes     Quit date: 4/1/2014     Years since quitting: 10.0    Smokeless tobacco: Current     Types: Chew    Tobacco comments:     still chew tobacco rarely   Substance and Sexual Activity    Alcohol use: Yes     Alcohol/week: 3.0 - 6.0 standard drinks of alcohol     Types: 3 - 6 Cans of beer per week     Comment: weekends    Drug use: Never    Sexual activity: Yes     Partners: Female     Birth control/protection: None     Social Determinants of Health     Financial Resource Strain: Low Risk  (5/8/2023)    Overall Financial Resource Strain (CARDIA)     Difficulty of Paying Living Expenses: Not hard at all   Food Insecurity: No Food Insecurity (5/8/2023)    Hunger Vital Sign     Worried About Running Out of Food in the Last Year: Never true     Ran Out of Food in the Last Year: Never true   Transportation Needs: No Transportation Needs (5/8/2023)    PRAPARE - Transportation     Lack of Transportation (Medical): No     Lack of Transportation (Non-Medical): No   Physical Activity: Inactive (5/8/2023)    Exercise Vital Sign     Days of Exercise per Week: 0 days     Minutes of Exercise per Session: 0 min   Stress: No Stress Concern Present (5/8/2023)    [employer] [place] [employer] - Occupational Stress Questionnaire     Feeling of Stress : Only a little   Social Connections: Moderately Integrated (5/8/2023)    Social Connection and Isolation Panel [NHANES]     Frequency of Communication with Friends and Family: Once a week     Frequency of Social Gatherings with Friends and Family: Never     Attends Quaker Services: More than 4 times per year     Active Member of  "Clubs or Organizations: Yes     Attends Club or Organization Meetings: Never     Marital Status:    Housing Stability: Low Risk  (5/8/2023)    Housing Stability Vital Sign     Unable to Pay for Housing in the Last Year: No     Number of Places Lived in the Last Year: 1     Unstable Housing in the Last Year: No     See above substance history    REVIEW OF SYSTEMS:   Comprehensive review of systems was discussed with the patient.  It is positive only for the above complaints.    PHYSICAL EXAMINATION:  Blood pressure 115/77, pulse 84, temperature 97.3 °F (36.3 °C), temperature source Temporal, resp. rate 16, height 5' 8" (1.727 m), weight 95.9 kg (211 lb 6.7 oz), SpO2 99%.    Constitutional: Non-toxic appearing.   Psychiatric: Appropriate mood and affect. Cooperative.  Voice: Non-dysphonic, speaking in full sentences.   Neurologic: Cranial nerves grossly intact, no focal deficits.  Head and face: Salivary glands are not enlarged. Face is symmetric. CN VII strength intact. Incision c/d/I. Sutures removed.   Skin: No concerning skin lesions. Incision healed, small white suture on tail of incision. Tried to remove today but not ready.   Eyes: Vision grossly intact, bilateral extraocular movements intact  Ears: Bilateral pinna, mastoid, external ear canal normal. Hearing intact.   Nose: External nose appears normal.   Lips: No ulcers or lesions  Oral cavity: Mucosa is pink and moist. Buccal mucosa, gingiva, anterior tongue, floor of mouth, and hard palate appear normal. No leukoplakia, erythroplakia, ulceration, masses or lesions.  Oropharynx: Mucosa is pink and moist. Soft palate and base of tongue are normal. Posterior pharyngeal wall normal. Tonsils are normal. No lesions.  Neck: Soft and flat,  no masses or lymphadenopathy. No palpable thyroid enlargement or nodules.  Respiratory: Chest expansion symmetric, no audible stridor or stertor. Breathing is unlabored. No active cough.    CLINICAL " PHOTOS:  4/15/24          PROCEDURES:   N/A    DATA REVIEWED:     LABORATORY:  N/A    PATHOLOGY:      IMAGING:  N/A      ASSESSMENT AND PLAN:  No diagnosis found.  resolved    Ab Lomas is a 63 y.o. male with facial mass.   -incision healign well, sutures removed today  -f/u PRN  - continue to Apply SPF 50+ sunscreen to the incision site to promote the best cosmetic result - sun will make the scar darker and more visible.    Patient encouraged to call with any questions, concerns, or new or worsening symptoms.     Follow up PRN

## 2024-05-01 ENCOUNTER — DOCUMENTATION ONLY (OUTPATIENT)
Dept: HEMATOLOGY/ONCOLOGY | Facility: CLINIC | Age: 64
End: 2024-05-01
Payer: COMMERCIAL

## 2024-05-01 NOTE — NURSING
Chart reviewed. Follow up with Dr. Phillips on PRN basis. No cancer navigation needs. Will sign off

## 2024-05-15 PROBLEM — K59.04 FUNCTIONAL CONSTIPATION: Status: ACTIVE | Noted: 2024-05-15

## 2024-05-15 PROBLEM — N18.32 ACUTE RENAL FAILURE SUPERIMPOSED ON STAGE 3B CHRONIC KIDNEY DISEASE: Status: ACTIVE | Noted: 2024-05-15

## 2024-05-15 PROBLEM — N17.9 ACUTE RENAL FAILURE SUPERIMPOSED ON STAGE 3B CHRONIC KIDNEY DISEASE: Status: ACTIVE | Noted: 2024-05-15

## 2024-05-15 PROBLEM — R31.0 GROSS HEMATURIA: Status: ACTIVE | Noted: 2024-05-15

## 2024-05-16 PROBLEM — R50.9 FEBRILE ILLNESS, ACUTE: Status: ACTIVE | Noted: 2024-05-16

## 2024-05-16 PROBLEM — E87.5 ACUTE HYPERKALEMIA: Status: ACTIVE | Noted: 2024-05-16

## 2024-05-17 PROBLEM — B96.5 BACTEREMIA DUE TO PSEUDOMONAS: Status: ACTIVE | Noted: 2023-05-09

## 2024-05-24 ENCOUNTER — TELEPHONE (OUTPATIENT)
Dept: INFECTIOUS DISEASES | Facility: CLINIC | Age: 64
End: 2024-05-24
Payer: COMMERCIAL

## 2024-05-24 NOTE — TELEPHONE ENCOUNTER
Per Kindra Mann PA-C -   As pt's kidney functions have improved (CrCl now at 31) please increase IV abx Meropenum to 1 gm q12 hours, from 500 mg q 12 hours.    Called Vladimir with [facility], gave above orders.

## 2024-06-04 ENCOUNTER — OFFICE VISIT (OUTPATIENT)
Dept: INFECTIOUS DISEASES | Facility: CLINIC | Age: 64
End: 2024-06-04
Payer: COMMERCIAL

## 2024-06-04 ENCOUNTER — TELEPHONE (OUTPATIENT)
Dept: INFECTIOUS DISEASES | Facility: CLINIC | Age: 64
End: 2024-06-04
Payer: COMMERCIAL

## 2024-06-04 VITALS
TEMPERATURE: 98 F | SYSTOLIC BLOOD PRESSURE: 134 MMHG | HEART RATE: 83 BPM | BODY MASS INDEX: 30.74 KG/M2 | WEIGHT: 202.81 LBS | DIASTOLIC BLOOD PRESSURE: 88 MMHG | RESPIRATION RATE: 20 BRPM | HEIGHT: 68 IN

## 2024-06-04 DIAGNOSIS — N99.89 STRICTURE OF URETERAL-ILEAL LOOP ANASTOMOSIS: ICD-10-CM

## 2024-06-04 DIAGNOSIS — C67.9 MALIGNANT NEOPLASM OF URINARY BLADDER, UNSPECIFIED SITE: ICD-10-CM

## 2024-06-04 DIAGNOSIS — R78.81 BACTEREMIA DUE TO PSEUDOMONAS: Primary | ICD-10-CM

## 2024-06-04 DIAGNOSIS — B96.5 BACTEREMIA DUE TO PSEUDOMONAS: Primary | ICD-10-CM

## 2024-06-04 DIAGNOSIS — Z90.5 H/O LEFT NEPHRECTOMY: ICD-10-CM

## 2024-06-04 PROCEDURE — 1111F DSCHRG MED/CURRENT MED MERGE: CPT | Mod: CPTII,S$GLB,, | Performed by: PHYSICIAN ASSISTANT

## 2024-06-04 PROCEDURE — 99999 PR PBB SHADOW E&M-EST. PATIENT-LVL III: CPT | Mod: PBBFAC,,, | Performed by: PHYSICIAN ASSISTANT

## 2024-06-04 PROCEDURE — 3075F SYST BP GE 130 - 139MM HG: CPT | Mod: CPTII,S$GLB,, | Performed by: PHYSICIAN ASSISTANT

## 2024-06-04 PROCEDURE — 3008F BODY MASS INDEX DOCD: CPT | Mod: CPTII,S$GLB,, | Performed by: PHYSICIAN ASSISTANT

## 2024-06-04 PROCEDURE — 3079F DIAST BP 80-89 MM HG: CPT | Mod: CPTII,S$GLB,, | Performed by: PHYSICIAN ASSISTANT

## 2024-06-04 PROCEDURE — 1159F MED LIST DOCD IN RCRD: CPT | Mod: CPTII,S$GLB,, | Performed by: PHYSICIAN ASSISTANT

## 2024-06-04 PROCEDURE — 99214 OFFICE O/P EST MOD 30 MIN: CPT | Mod: S$GLB,,, | Performed by: PHYSICIAN ASSISTANT

## 2024-06-04 PROCEDURE — 4010F ACE/ARB THERAPY RXD/TAKEN: CPT | Mod: CPTII,S$GLB,, | Performed by: PHYSICIAN ASSISTANT

## 2024-06-04 NOTE — PROGRESS NOTES
[facility]  [facility]        Patient ID: Ab Lomas is a 63 y.o. male.    Chief Complaint: Follow-up      Ab Torres" was seen today for follow-up.    Diagnoses and all orders for this visit:    Bacteremia due to Pseudomonas    H/O left nephrectomy    Stricture of ureteral-ileal loop anastomosis    Malignant neoplasm of urinary bladder, unspecified site         Greater than 30 minutes was spent on this encounter, which included: review of recent encounters, review and interpretation of labs/images, obtaining pertinent history, performing a physical examination, counseling and educating the patient/family/caregiver, ordering medications/tests, documenting in the electronic health record, and coordinating care with necessary providers.    Interval HPI:   6/4/24 - ID clinic f/u. Patient has been improving since hospital discharge. Hematuria resolved. UOP is increasing. Saw Dr. Russell and per patient, plans to have IR evaluate his nephrostomy tube and where he may have a blockage in his renal system in order to determine if stents should be placed. No dates scheduled for this yet. He denies fevers, chills, night sweats, or diarrhea. No issues with the IV Meropenem.     Assessment and Plan:     # Sepsis secondary to Pseudomonas bacteremia  - Likely secondary to obstructive uropathy/right hydronephrosis  - 5/15/24 S/P right nephrostomy tube placement  - 5/15: Urine culture:  Negative   - 5/16: Bcx: Pseudomonas (R/I - FQ, Cefepime ANIKA=8)  - 5/18 Bcx: NGTD  - TTE: neg for vegetation  - discharged home on IV Meropenem to continue for at least 2 weeks (EOC 6/4/24), but may extend longer if N-tube is planning to be removed in the near future or if he is pending further Urologic procedure     # BERTHA on CKD  - resolving     # Solitary kidney s/p left nephrectomy     # History of bladder cancer, s/p roldan-bladder creation     # Remote History of Pseudomonas bacteremia     Recs:  - labs " reviewed. He remains at current adequate dose for his CrCl  - cont Meropenem 1g IV q8h adjusted to kidney function (current dose 1g q12h)  - extend therapy an additional 3 weeks since he is pending upcoming procedures with unknown dates (new EOC 6/25/24). Plan will be to adjust date until 3 days after last procedure is complete. If this is prior to 6/25, then EOC can be adjusted  - continue CMP and CBC as prior  - patient will keep us updated on plans      Will forward note to Dr. Russell and Dr. Paulino.      SUPRIYA MckeonC  Infectious Diseases  [facility]     HPI:      This is a 63-year-old man with multiple comorbidities.  CKD, bladder cancer SP left nephrectomy and neobladder, nephrolithiasis, recurrent UTIs.  Known to ID service in the past due to Pseudomonas bacteremia for which patient required prolonged antimicrobials     - Patient came to this hospital on 05/15/2024 due to urinary retention.  - During initial evaluation in the ED patient was stable with no fevers at 1st but patient did develop fevers after.    - Patient was noted to leukocytosis.  CT abdomen with evidence of right hydronephrosis.    - Urology evaluated patient requested nephrostomy tube which was placed on 05/15/2024.           Past Medical History:   Diagnosis Date    Bladder cancer     History of hypertension        Past Surgical History:   Procedure Laterality Date    BLADDER SURGERY      removal with new one made    chemo treatment      done 12/04/2019    NEPHRECTOMY Left 05/2021    PROSTATE SURGERY  06/18/2019    pat reported       Family History   Problem Relation Name Age of Onset    Cancer Mother          possible lung    Hypertension Mother      Cancer Maternal Uncle          lung    No Known Problems Sister      No Known Problems Brother      No Known Problems Maternal Grandmother      Heart disease Maternal Grandfather      No Known Problems Paternal Grandfather         Social History      Socioeconomic History    Marital status:      Spouse name: Janae    Number of children: 2   Tobacco Use    Smoking status: Former     Current packs/day: 0.00     Types: Cigarettes     Quit date: 4/1/2014     Years since quitting: 10.1    Smokeless tobacco: Current     Types: Chew    Tobacco comments:     still chew tobacco rarely   Substance and Sexual Activity    Alcohol use: Yes     Alcohol/week: 3.0 - 6.0 standard drinks of alcohol     Types: 3 - 6 Cans of beer per week     Comment: weekends    Drug use: Never    Sexual activity: Yes     Partners: Female     Birth control/protection: None     Social Determinants of Health     Financial Resource Strain: Low Risk  (5/8/2023)    Overall Financial Resource Strain (CARDIA)     Difficulty of Paying Living Expenses: Not hard at all   Food Insecurity: No Food Insecurity (5/16/2024)    Hunger Vital Sign     Worried About Running Out of Food in the Last Year: Never true     Ran Out of Food in the Last Year: Never true   Transportation Needs: No Transportation Needs (5/16/2024)    TRANSPORTATION NEEDS     Transportation : No   Physical Activity: Inactive (5/8/2023)    Exercise Vital Sign     Days of Exercise per Week: 0 days     Minutes of Exercise per Session: 0 min   Stress: No Stress Concern Present (5/8/2023)    [employer] [place] [employer] - Occupational Stress Questionnaire     Feeling of Stress : Only a little   Housing Stability: Low Risk  (5/16/2024)    Housing Stability Vital Sign     Unable to Pay for Housing in the Last Year: No     Homeless in the Last Year: No       Review of patient's allergies indicates:  No Known Allergies    Current Outpatient Medications   Medication Instructions    acetaminophen (TYLENOL) 1,000 mg, Every 6 hours PRN    b complex vitamins capsule 1 capsule, Daily    cyanocobalamin, vitamin B-12, (VITAMIN B-12 ORAL) 1 tablet, Daily    docusate sodium (STOOL SOFTENER ORAL) 2 capsules, Nightly    fluticasone propionate  (FLONASE) 50 mcg/actuation nasal spray 1 spray, Daily PRN    Lactobacillus rhamnosus GG (CULTURELLE) 10 billion cell capsule 1 capsule, Oral, Daily    senna (SENOKOT) 8.6 mg tablet 1 tablet, Oral, Daily    sodium bicarbonate 650 mg, 2 times daily    sodium chloride 0.9 % PgBk 100 mL with meropenem 500 mg SolR 500 mg 500 mg, Intravenous, Every 12 hours         Review of Systems   Constitutional:  Negative for activity change, appetite change, chills, fatigue and fever.   HENT:  Negative for congestion, mouth sores, sinus pain and sore throat.    Eyes:  Negative for photophobia and visual disturbance.   Respiratory:  Negative for cough, chest tightness, shortness of breath and wheezing.    Cardiovascular:  Negative for chest pain, palpitations and leg swelling.   Gastrointestinal:  Negative for abdominal pain, diarrhea, nausea and vomiting.   Genitourinary:  Negative for difficulty urinating, dysuria, flank pain, hematuria and urgency.   Musculoskeletal:  Negative for arthralgias, myalgias, neck pain and neck stiffness.   Skin:  Negative for color change and rash.   Neurological:  Negative for dizziness, seizures and headaches.   Psychiatric/Behavioral:  Negative for confusion.            Objective:     Vitals:    06/04/24 1044   BP: 134/88   Pulse: 83   Resp: 20   Temp: 98.2 °F (36.8 °C)              Physical Exam  Vitals and nursing note reviewed.   Constitutional:       General: He is awake. He is not in acute distress.     Appearance: He is well-developed and well-groomed. He is not diaphoretic.   HENT:      Head: Normocephalic and atraumatic.      Right Ear: External ear normal.      Left Ear: External ear normal.      Nose: Nose normal.   Eyes:      General: No scleral icterus.        Right eye: No discharge.         Left eye: No discharge.      Pupils: Pupils are equal, round, and reactive to light.   Cardiovascular:      Rate and Rhythm: Normal rate and regular rhythm.   Pulmonary:      Effort: Pulmonary  effort is normal. No accessory muscle usage or respiratory distress.   Abdominal:      General: There is no distension.   Musculoskeletal:      Cervical back: Normal range of motion and neck supple.   Skin:     General: Skin is warm and dry.      Comments: Right N-tube in place  PICC line c/d/i   Neurological:      General: No focal deficit present.      Mental Status: He is alert and oriented to person, place, and time.   Psychiatric:         Mood and Affect: Mood normal.         Behavior: Behavior normal.           Estimated Creatinine Clearance: 33.5 mL/min (A) (based on SCr of 2.45 mg/dL (H)).      Microbiology Results (last 7 days)       ** No results found for the last 168 hours. **              Significant Labs: All pertinent labs within the past 24 hours have been reviewed.     Significant Imaging: I have reviewed all relevant and available imaging results/findings within the past 24 hours.      Plan -- see top of note

## 2024-06-04 NOTE — TELEPHONE ENCOUNTER
Per Kindra Mann PA-C -   Extend IV Meropenem another 3 weeks, EOC 6/25/24, or longer pending procedures. Sagar will follow.    Spoke with Vladimir at [facility], gave above orders.

## 2024-06-19 PROBLEM — N13.30 HYDRONEPHROSIS: Status: ACTIVE | Noted: 2024-06-19

## 2024-06-20 ENCOUNTER — TELEPHONE (OUTPATIENT)
Dept: INFECTIOUS DISEASES | Facility: CLINIC | Age: 64
End: 2024-06-20
Payer: COMMERCIAL

## 2024-06-20 NOTE — TELEPHONE ENCOUNTER
Per Kindra Mann PA-C - Discussed with patient that IV abx will continue for 3 days after stent removal, per patient - stent removal sometimes next week, awaiting a date.   Contacted Vladimir with [facility] to remove PICC line 3 days after stent removal, pt will let [facility] know when stent is to be removed    Follow up PRN with ID.